# Patient Record
Sex: FEMALE | Race: WHITE | URBAN - METROPOLITAN AREA
[De-identification: names, ages, dates, MRNs, and addresses within clinical notes are randomized per-mention and may not be internally consistent; named-entity substitution may affect disease eponyms.]

---

## 2017-03-06 ENCOUNTER — OFFICE VISIT (OUTPATIENT)
Dept: BEHAVIORAL/MENTAL HEALTH CLINIC | Age: 32
End: 2017-03-06

## 2017-03-06 VITALS
SYSTOLIC BLOOD PRESSURE: 123 MMHG | DIASTOLIC BLOOD PRESSURE: 97 MMHG | HEIGHT: 63 IN | HEART RATE: 102 BPM | BODY MASS INDEX: 31.89 KG/M2 | WEIGHT: 180 LBS

## 2017-03-06 DIAGNOSIS — F41.9 ANXIETY: Chronic | ICD-10-CM

## 2017-03-06 DIAGNOSIS — F84.0 AUTISM SPECTRUM DISORDER: Chronic | ICD-10-CM

## 2017-03-06 DIAGNOSIS — F42.9 OCD (OBSESSIVE COMPULSIVE DISORDER): Primary | ICD-10-CM

## 2017-03-06 RX ORDER — ARIPIPRAZOLE 5 MG/1
2.5 TABLET ORAL
Qty: 15 TAB | Refills: 2 | Status: SHIPPED | OUTPATIENT
Start: 2017-03-06 | End: 2017-06-06 | Stop reason: SDUPTHER

## 2017-03-06 RX ORDER — FLUVOXAMINE MALEATE 100 MG/1
TABLET, COATED ORAL
Qty: 90 TAB | Refills: 2 | Status: SHIPPED | OUTPATIENT
Start: 2017-03-06 | End: 2017-06-06 | Stop reason: SDUPTHER

## 2017-03-06 RX ORDER — LAMOTRIGINE 200 MG/1
TABLET ORAL
Qty: 30 TAB | Refills: 2 | Status: SHIPPED | OUTPATIENT
Start: 2017-03-06 | End: 2017-06-06 | Stop reason: SDUPTHER

## 2017-03-06 NOTE — PROGRESS NOTES
Psychiatric Outpatient Progress Note    Account Number:  [id number]  Name: Apolinar Phillips    SUBJECTIVE:   CHIEF COMPLAINT:  Apolinar Phillips is a 28 y.o. female and was seen today for follow-up of psychiatric condition and psychotropic medication management. HPI:    Maude Chavez reports the following psychiatric symptoms: mood ok. 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 She continues to binge eat and mother is trying behavior modification to help with this. Pt and mom are exploring volunteering at food bank, [employer] or elder facility. No longer exercises. Patient denies SI/HI/SIB. Side Effects:  none      Fam/Soc Hx (from [place] with updates): The patient lives with her parents. She has talked about moving out before but does not have the life skills to be able to do so. 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 She is primarily supported by her parents. Her mother is her payee for her disability. She was raised by her parents. She has a younger brother who is 32. She is a high school graduate with honors and has no legal entanglements. No abuse history. Her hobbies include bible study, cruz-based groups and friends.      REVIEW OF SYSTEMS:  Psychiatric:  baseline  Appetite:  binge eating   Sleep: good       OBJECTIVE:                 Mental Status exam: WNL except for      Sensorium  oriented to time, place and person   Relations guarded    Eye Contact    poor   Appearance:  age appropriate and disheveled   Motor Behavior/Gait:  within normal limits   Speech:  normal pitch and normal volume   Thought Process: goal directed   Thought Content free of delusions and free of hallucinations   Suicidal ideations none   Homicidal ideations none   Mood:  euthymic   Affect:  euthymic   Memory recent  adequate   Memory remote:  adequate   Concentration:  adequate Abstraction:  concrete   Insight:  limited   Reliability fair   Judgment:  limited       MEDICAL DECISION MAKING  Data: pertinent labs, imaging, medical records and diagnostic tests reviewed and incorporated in diagnosis and treatment plan    Allergies   Allergen Reactions    Amoxil [Amoxicillin] Hives    Other Medication Rash     Benozyl Peroxide--Rash    Risperdal [Risperidone] Other (comments)     galactorrhea        Current Outpatient Prescriptions   Medication Sig Dispense Refill    lamoTRIgine (LAMICTAL) 200 mg tablet TAKE 1 TABLET BY MOUTH DAILY 30 Tab 2    fluvoxaMINE (LUVOX) 100 mg tablet TAKE THREE (3) TABLET(S) EVERY EVENING 90 Tab 2    ARIPiprazole (ABILIFY) 5 mg tablet Take 0.5 Tabs by mouth daily (after breakfast). 15 Tab 2    multivitamin (ONE A DAY) tablet Take 1 Tab by mouth daily.  clonazePAM (KLONOPIN) 0.5 mg tablet Take 1 Tab by mouth two (2) times daily as needed. Max Daily Amount: 1 mg. 30 Tab 0    norethindrone-ethinyl estradiol (NORINYL 1+35, 28,) 1-35 mg-mcg per tablet Take  by mouth daily. Visit Vitals    BP (!) 123/97    Pulse (!) 102    Ht 5' 3\" (1.6 m)    Wt 81.6 kg (180 lb)    BMI 31.89 kg/m2     Wt Readings from Last 3 Encounters:   03/06/17 81.6 kg (180 lb)   09/21/16 77.1 kg (170 lb)   08/12/16 75.1 kg (165 lb 8 oz)       Problems addressed today:    ICD-10-CM ICD-9-CM    1. OCD (obsessive compulsive disorder) F42.9 300.3 lamoTRIgine (LAMICTAL) 200 mg tablet      fluvoxaMINE (LUVOX) 100 mg tablet      ARIPiprazole (ABILIFY) 5 mg tablet   2. Anxiety F41.9 300.00 lamoTRIgine (LAMICTAL) 200 mg tablet      fluvoxaMINE (LUVOX) 100 mg tablet   3. Autism spectrum disorder F84.0 299.00 ARIPiprazole (ABILIFY) 5 mg tablet     1. Autism. 2. Anxiety NOS. 3. Obsessive-compulsive disorder. (Compulsive overeating). 4. Medical - obesity. 5. Psychosocial - environmental factors, developmental disorder needing continuous care and supervision.     Assessment:   Kiana Delaney Cornelius Reid  is a 28 y.o. female is responding to treatment. Symptoms are stable but is having some behavioral issues regarding binge eating. Mother is monitoring and addressing as necessary. Discussed healthy way of coping with stress and how to incorporate exercise into her schedule now that she is not working. Pt also trying to slow down some shopping activities as she no longer has an income. Asymptomatic HTN today. Patient denies SI/HI/SIB. No evidence of AH/VH or delusions. Risk Scoring- chronic illnesses and prescription drug management    Treatment Plan:  1. Medications:          Medication Changes/Adjustments:   Luvox 300 mg qhs  Lamictal 200 mg daily  Abilify 2.5 mg daily  Klonopin 0.5 mg bid -has 3 refills left 9/2016    Current Outpatient Prescriptions   Medication Sig Dispense Refill    lamoTRIgine (LAMICTAL) 200 mg tablet TAKE 1 TABLET BY MOUTH DAILY 30 Tab 2    fluvoxaMINE (LUVOX) 100 mg tablet TAKE THREE (3) TABLET(S) EVERY EVENING 90 Tab 2    ARIPiprazole (ABILIFY) 5 mg tablet Take 0.5 Tabs by mouth daily (after breakfast). 15 Tab 2    multivitamin (ONE A DAY) tablet Take 1 Tab by mouth daily.  clonazePAM (KLONOPIN) 0.5 mg tablet Take 1 Tab by mouth two (2) times daily as needed. Max Daily Amount: 1 mg. 30 Tab 0    norethindrone-ethinyl estradiol (NORINYL 1+35, 28,) 1-35 mg-mcg per tablet Take  by mouth daily. The following regarding medications was addressed:    (The risks and benefits of the proposed medication; the potential medication side effects ie    dry mouth, weight gain, GI upset, headache; patient given opportunity to ask questions)       2. Counseling and coordination of care including instructions for treatment, risks/benefits, risk factor reduction and patient/family education. She agrees with the plan. Patient instructed to call with any side effects, questions or issues.   20 minutes CBT on the following:   -treatment goals:  20 min exercise 4 x per week   -negative thoughts -write and release (in process)    3. Follow-up Disposition:  Return in about 3 months (around 6/6/2017). PSYCHOTHERAPY:  approx 20 minutes  Type:  Supportive/Cognitive Behavioral therapy provided  Focus:     Current problems -food choices, increasing physical activities, money management   Occupational issues   Interpersonal conflicts  Psychoeducation provided  Treatment plan reviewed with patient-including diagnosis and medications    Yaquelin Castillo is progressing.     Tyler Max NP  3/6/2017

## 2017-03-06 NOTE — MR AVS SNAPSHOT
Visit Information Date & Time Provider Department Dept. Phone Encounter #  
 3/6/2017  9:30 AM Tyler Curtisrand, [facility] [phone] [id number] Follow-up Instructions Return in about 3 months (around 6/6/2017). Upcoming Health Maintenance Date Due INFLUENZA AGE 9 TO ADULT 8/1/2016 PAP AKA CERVICAL CYTOLOGY 9/5/2017 DTaP/Tdap/Td series (7 - Td) 4/8/2018 Allergies as of 3/6/2017  Review Complete On: 3/6/2017 By: Tressa Delgado Severity Noted Reaction Type Reactions Amoxil [Amoxicillin]  01/12/2010    Hives Other Medication  01/12/2010    Rash Benozyl [employer] Risperdal [Risperidone]  03/19/2015    Other (comments)  
 galactorrhea Current Immunizations  Reviewed on 10/12/2011 Name Date DTAP Vaccine 3/21/1990, 8/12/1986, 1985, 1985, 1985 HIB Vaccine 2/27/1987 Hepatitis B Vaccine 8/20/2002, 8/31/2001, 4/16/2001 MMR Vaccine 5/10/1995, 5/13/1986 OPV 3/21/1990, 8/12/1986, 1985, 1985 TDAP Vaccine 4/8/2008 Not reviewed this visit You Were Diagnosed With   
  
 Codes Comments OCD (obsessive compulsive disorder)    -  Primary ICD-10-CM: F42.9 ICD-9-CM: 300.3 Anxiety     ICD-10-CM: F41.9 ICD-9-CM: 300.00 Autism spectrum disorder     ICD-10-CM: F84.0 ICD-9-CM: 299.00 Vitals BP Pulse Height(growth percentile) Weight(growth percentile) BMI OB Status (!) 123/97 (!) 102 5' 3\" (1.6 m) 180 lb (81.6 kg) 31.89 kg/m2 Having regular periods Smoking Status Never Smoker Vitals History BMI and BSA Data Body Mass Index Body Surface Area  
 31.89 kg/m 2 1.9 m 2 Preferred Pharmacy Pharmacy Name Phone [facility] [address], [address] [phone] Your Updated Medication List  
  
   
This list is accurate as of: 3/6/17  9:46 AM.  Always use your most recent med list.  
  
  
  
 ARIPiprazole 5 mg tablet Commonly known as:  ABILIFY Take 0.5 Tabs by mouth daily (after breakfast). clonazePAM 0.5 mg tablet Commonly known as:  Guy Pen Take 1 Tab by mouth two (2) times daily as needed. Max Daily Amount: 1 mg. fluvoxaMINE 100 mg tablet Commonly known as:  Raymangod Thiago TAKE THREE (3) TABLET(S) EVERY EVENING  
  
 lamoTRIgine 200 mg tablet Commonly known as: LaMICtal  
TAKE 1 TABLET BY MOUTH DAILY  
  
 multivitamin tablet Commonly known as:  ONE A DAY Take 1 Tab by mouth daily. NORINYL 1+35 (28) 1-35 mg-mcg Tab Generic drug:  norethindrone-ethinyl estradiol Take  by mouth daily. Prescriptions Sent to Pharmacy Refills  
 lamoTRIgine (LAMICTAL) 200 mg tablet 2 Sig: TAKE 1 TABLET BY MOUTH DAILY Class: Normal  
 Pharmacy: [facility] Ph #: [phone]  
 fluvoxaMINE (LUVOX) 100 mg tablet 2 Sig: TAKE THREE (3) TABLET(S) EVERY EVENING Class: Normal  
 Pharmacy: [facility] [address] Ph #: [phone] ARIPiprazole (ABILIFY) 5 mg tablet 2 Sig: Take 0.5 Tabs by mouth daily (after breakfast). Class: Normal  
 Pharmacy: [facility] [address] Ph #: [phone] Route: Oral  
  
Follow-up Instructions Return in about 3 months (around 6/6/2017). Introducing [facility] & HEALTH SERVICES! Srinivas Malik introduces [employer] patient portal. Now you can access parts of your medical record, email your doctor's office, and request medication refills online. 1. In your internet browser, go to [email or web address] 2. Click on the First Time User? Click Here link in the Sign In box. You will see the New Member Sign Up page. 3. Enter your [employer] Access Code exactly as it appears below. You will not need to use this code after youve completed the sign-up process.  If you do not sign up before the expiration date, you must request a new code. · [employer] Access Code: [email or web address] Expires: 6/4/2017  9:46 AM 
 
4. Enter the last four digits of your Social Security Number (xxxx) and Date of Birth (mm/dd/yyyy) as indicated and click Submit. You will be taken to the next sign-up page. 5. Create a [employer] ID. This will be your [employer] login ID and cannot be changed, so think of one that is secure and easy to remember. 6. Create a [employer] password. You can change your password at any time. 7. Enter your Password Reset Question and Answer. This can be used at a later time if you forget your password. 8. Enter your e-mail address. You will receive e-mail notification when new information is available in [address] 9. Click Sign Up. You can now view and download portions of your medical record. 10. Click the Download Summary menu link to download a portable copy of your medical information. If you have questions, please visit the Frequently Asked Questions section of the [employer] website. Remember, [employer] is NOT to be used for urgent needs. For medical emergencies, dial 911. Now available from your iPhone and Android! Please provide this summary of care documentation to your next provider. Your primary care clinician is listed as [phone] If you have any questions after today's visit, please call [phone].

## 2017-03-18 DIAGNOSIS — F42.9 OCD (OBSESSIVE COMPULSIVE DISORDER): ICD-10-CM

## 2017-03-18 DIAGNOSIS — F41.9 ANXIETY: Chronic | ICD-10-CM

## 2017-03-20 RX ORDER — LAMOTRIGINE 200 MG/1
TABLET ORAL
Qty: 30 TAB | Refills: 2 | OUTPATIENT
Start: 2017-03-20

## 2017-03-20 RX ORDER — FLUVOXAMINE MALEATE 100 MG/1
TABLET, COATED ORAL
Qty: 90 TAB | Refills: 2 | OUTPATIENT
Start: 2017-03-20

## 2017-03-24 DIAGNOSIS — F84.0 AUTISM SPECTRUM DISORDER: Chronic | ICD-10-CM

## 2017-03-24 DIAGNOSIS — F42.9 OCD (OBSESSIVE COMPULSIVE DISORDER): ICD-10-CM

## 2017-03-27 RX ORDER — ARIPIPRAZOLE 5 MG/1
TABLET ORAL
Qty: 15 TAB | Refills: 2 | OUTPATIENT
Start: 2017-03-27

## 2017-06-06 ENCOUNTER — OFFICE VISIT (OUTPATIENT)
Dept: BEHAVIORAL/MENTAL HEALTH CLINIC | Age: 32
End: 2017-06-06

## 2017-06-06 VITALS
BODY MASS INDEX: 31.54 KG/M2 | WEIGHT: 178 LBS | DIASTOLIC BLOOD PRESSURE: 88 MMHG | HEIGHT: 63 IN | SYSTOLIC BLOOD PRESSURE: 121 MMHG | HEART RATE: 81 BPM

## 2017-06-06 DIAGNOSIS — F42.8 OTHER OBSESSIVE-COMPULSIVE DISORDERS: ICD-10-CM

## 2017-06-06 DIAGNOSIS — F84.0 AUTISM SPECTRUM DISORDER: Chronic | ICD-10-CM

## 2017-06-06 DIAGNOSIS — F41.9 ANXIETY: Chronic | ICD-10-CM

## 2017-06-06 RX ORDER — ARIPIPRAZOLE 5 MG/1
2.5 TABLET ORAL
Qty: 15 TAB | Refills: 2 | Status: SHIPPED | OUTPATIENT
Start: 2017-06-06 | End: 2017-12-06 | Stop reason: ALTCHOICE

## 2017-06-06 RX ORDER — LAMOTRIGINE 200 MG/1
TABLET ORAL
Qty: 30 TAB | Refills: 2 | Status: SHIPPED | OUTPATIENT
Start: 2017-06-06 | End: 2017-09-06 | Stop reason: SDUPTHER

## 2017-06-06 RX ORDER — FLUVOXAMINE MALEATE 100 MG/1
TABLET, COATED ORAL
Qty: 90 TAB | Refills: 2 | Status: SHIPPED | OUTPATIENT
Start: 2017-06-06 | End: 2017-09-06 | Stop reason: SDUPTHER

## 2017-06-06 NOTE — PROGRESS NOTES
Psychiatric Outpatient Progress Note    Account Number:  [id number]  Name: Josiah Jhaveri    SUBJECTIVE:   CHIEF COMPLAINT:  Josiah Jhaveri is a 28 y.o. female and was seen today for follow-up of psychiatric condition and psychotropic medication management. HPI:    Jamia Griffin reports the following psychiatric symptoms: mood ok. 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 She continues to binge eat and mother is trying behavior modification to help with this. 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 No longer exercises. Patient denies SI/HI/SIB. Side Effects:  none      Fam/Soc Hx (from [place] with updates): The patient lives with her parents. She has talked about moving out before but does not have the life skills to be able to do so. 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 She is primarily supported by her parents. Her mother is her payee for her disability. She was raised by her parents. She has a younger brother who is 32. She is a high school graduate with honors and has no legal entanglements. No abuse history. Her hobbies include bible study, cruz-based groups and friends.      REVIEW OF SYSTEMS:  Psychiatric:  baseline  Appetite:  binge eating   Sleep: good       OBJECTIVE:                 Mental Status exam: WNL except for      Sensorium  oriented to time, place and person   Relations guarded    Eye Contact    poor   Appearance:  age appropriate and disheveled   Motor Behavior/Gait:  within normal limits   Speech:  normal pitch and normal volume   Thought Process: goal directed   Thought Content free of delusions and free of hallucinations   Suicidal ideations none   Homicidal ideations none   Mood:  euthymic   Affect:  euthymic   Memory recent  adequate   Memory remote:  adequate   Concentration:  adequate   Abstraction: concrete   Insight:  limited   Reliability fair   Judgment:  limited       MEDICAL DECISION MAKING  Data: pertinent labs, imaging, medical records and diagnostic tests reviewed and incorporated in diagnosis and treatment plan    Allergies   Allergen Reactions    Amoxil [Amoxicillin] Hives    Other Medication Rash     Benozyl Peroxide--Rash    Risperdal [Risperidone] Other (comments)     galactorrhea        Current Outpatient Prescriptions   Medication Sig Dispense Refill    lamoTRIgine (LAMICTAL) 200 mg tablet TAKE 1 TABLET BY MOUTH DAILY 30 Tab 2    fluvoxaMINE (LUVOX) 100 mg tablet TAKE THREE (3) TABLET(S) EVERY EVENING 90 Tab 2    ARIPiprazole (ABILIFY) 5 mg tablet Take 0.5 Tabs by mouth daily (after breakfast). 15 Tab 2    multivitamin (ONE A DAY) tablet Take 1 Tab by mouth daily.  clonazePAM (KLONOPIN) 0.5 mg tablet Take 1 Tab by mouth two (2) times daily as needed. Max Daily Amount: 1 mg. 30 Tab 0    norethindrone-ethinyl estradiol (NORINYL 1+35, 28,) 1-35 mg-mcg per tablet Take  by mouth daily. Visit Vitals    /88    Pulse 81    Ht 5' 3\" (1.6 m)    Wt 80.7 kg (178 lb)    BMI 31.53 kg/m2     Wt Readings from Last 3 Encounters:   06/06/17 80.7 kg (178 lb)   03/06/17 81.6 kg (180 lb)   09/21/16 77.1 kg (170 lb)       Problems addressed today:    ICD-10-CM ICD-9-CM    1. Other obsessive-compulsive disorders F42.8  lamoTRIgine (LAMICTAL) 200 mg tablet      fluvoxaMINE (LUVOX) 100 mg tablet      ARIPiprazole (ABILIFY) 5 mg tablet   2. Anxiety F41.9 300.00 lamoTRIgine (LAMICTAL) 200 mg tablet      fluvoxaMINE (LUVOX) 100 mg tablet   3. Autism spectrum disorder F84.0 299.00 ARIPiprazole (ABILIFY) 5 mg tablet     1. Autism. 2. Anxiety NOS. 3. Obsessive-compulsive disorder. (Compulsive overeating). 4. Medical - obesity. 5. Psychosocial - environmental factors, developmental disorder needing continuous care and supervision.     Assessment:   Tamir Gayle  is a 28 y.o. female is responding to treatment. Symptoms are stable but is having some behavioral issues regarding binge eating. Mother is monitoring and addressing as necessary. Discussed healthy way of coping with stress and how to incorporate exercise into her schedule now that she is not working. Pt also trying to slow down some shopping activities as she no longer has an income. Patient denies SI/HI/SIB. No evidence of AH/VH or delusions. Risk Scoring- chronic illnesses and prescription drug management    Treatment Plan:  1. Medications:          Medication Changes/Adjustments:   Luvox 300 mg qhs  Lamictal 200 mg daily  Abilify 2.5 mg daily  Klonopin 0.5 mg bid -has 3 refills left 9/2016    Current Outpatient Prescriptions   Medication Sig Dispense Refill    lamoTRIgine (LAMICTAL) 200 mg tablet TAKE 1 TABLET BY MOUTH DAILY 30 Tab 2    fluvoxaMINE (LUVOX) 100 mg tablet TAKE THREE (3) TABLET(S) EVERY EVENING 90 Tab 2    ARIPiprazole (ABILIFY) 5 mg tablet Take 0.5 Tabs by mouth daily (after breakfast). 15 Tab 2    multivitamin (ONE A DAY) tablet Take 1 Tab by mouth daily.  clonazePAM (KLONOPIN) 0.5 mg tablet Take 1 Tab by mouth two (2) times daily as needed. Max Daily Amount: 1 mg. 30 Tab 0    norethindrone-ethinyl estradiol (NORINYL 1+35, 28,) 1-35 mg-mcg per tablet Take  by mouth daily. The following regarding medications was addressed:    (The risks and benefits of the proposed medication; the potential medication side effects ie    dry mouth, weight gain, GI upset, headache; patient given opportunity to ask questions)       2. Counseling and coordination of care including instructions for treatment, risks/benefits, risk factor reduction and patient/family education. She agrees with the plan. Patient instructed to call with any side effects, questions or issues.   20 minutes CBT on the following:   -treatment goals:  20 min exercise 4 x per week   -negative thoughts -write and release (in process)    3. Follow-up Disposition:  Return in about 3 months (around 9/6/2017). PSYCHOTHERAPY:  approx 20 minutes  Type:  Supportive/Cognitive Behavioral therapy provided  Focus:     Current problems -food choices, increasing physical activities, money management   Occupational issues   Interpersonal conflicts  Psychoeducation provided  Treatment plan reviewed with patient-including diagnosis and medications    Jamia Griffin is progressing.     Rosendo Iverson NP  6/6/2017

## 2017-06-06 NOTE — MR AVS SNAPSHOT
Visit Information Date & Time Provider Department Dept. Phone Encounter #  
 6/6/2017  2:30 PM Autumn Dye, [address] [facility] [id number] Follow-up Instructions Return in about 3 months (around 9/6/2017). Upcoming Health Maintenance Date Due  
 PAP AKA CERVICAL CYTOLOGY 9/5/2017 INFLUENZA AGE 9 TO ADULT 8/1/2017 DTaP/Tdap/Td series (7 - Td) 4/8/2018 Allergies as of 6/6/2017  Review Complete On: 6/6/2017 By: Autumn Dye NP Severity Noted Reaction Type Reactions Amoxil [Amoxicillin]  01/12/2010    Hives Other Medication  01/12/2010    Rash Benozyl [employer] Risperdal [Risperidone]  03/19/2015    Other (comments)  
 galactorrhea Current Immunizations  Reviewed on 10/12/2011 Name Date DTAP Vaccine 3/21/1990, 8/12/1986, 1985, 1985, 1985 HIB Vaccine 2/27/1987 Hepatitis B Vaccine 8/20/2002, 8/31/2001, 4/16/2001 MMR Vaccine 5/10/1995, 5/13/1986 OPV 3/21/1990, 8/12/1986, 1985, 1985 TDAP Vaccine 4/8/2008 Not reviewed this visit You Were Diagnosed With   
  
 Codes Comments Other obsessive-compulsive disorders     ICD-10-CM: F42.8 Anxiety     ICD-10-CM: F41.9 ICD-9-CM: 300.00 Autism spectrum disorder     ICD-10-CM: F84.0 ICD-9-CM: 299.00 Vitals BP Pulse Height(growth percentile) Weight(growth percentile) BMI OB Status 121/88 81 5' 3\" (1.6 m) 178 lb (80.7 kg) 31.53 kg/m2 Having regular periods Smoking Status Never Smoker Vitals History BMI and BSA Data Body Mass Index Body Surface Area  
 31.53 kg/m 2 1.89 m 2 Preferred Pharmacy Pharmacy Name Phone [facility] [address], [address] [phone] Your Updated Medication List  
  
   
This list is accurate as of: 6/6/17  2:51 PM.  Always use your most recent med list.  
  
  
  
  
 ARIPiprazole 5 mg tablet Commonly known as:  ABILIFY Take 0.5 Tabs by mouth daily (after breakfast). clonazePAM 0.5 mg tablet Commonly known as:  Jonny Bur Take 1 Tab by mouth two (2) times daily as needed. Max Daily Amount: 1 mg. fluvoxaMINE 100 mg tablet Commonly known as:  Sha Nishi TAKE THREE (3) TABLET(S) EVERY EVENING  
  
 lamoTRIgine 200 mg tablet Commonly known as: LaMICtal  
TAKE 1 TABLET BY MOUTH DAILY  
  
 multivitamin tablet Commonly known as:  ONE A DAY Take 1 Tab by mouth daily. NORINYL 1/35 (28) 1-35 mg-mcg Tab Generic drug:  norethindrone-ethinyl estradiol Take  by mouth daily. Prescriptions Printed Refills  
 lamoTRIgine (LAMICTAL) 200 mg tablet 2 Sig: TAKE 1 TABLET BY MOUTH DAILY Class: Print  
 fluvoxaMINE (LUVOX) 100 mg tablet 2 Sig: TAKE THREE (3) TABLET(S) EVERY EVENING Class: Print ARIPiprazole (ABILIFY) 5 mg tablet 2 Sig: Take 0.5 Tabs by mouth daily (after breakfast). Class: Print Route: Oral  
  
Follow-up Instructions Return in about 3 months (around 9/6/2017). Introducing [facility] & HEALTH SERVICES! Malik Virk introduces [email or web address] patient portal. Now you can access parts of your medical record, email your doctor's office, and request medication refills online. 1. In your internet browser, go to [email or web address] 2. Click on the First Time User? Click Here link in the Sign In box. You will see the New Member Sign Up page. 3. Enter your [email or web address] Access Code exactly as it appears below. You will not need to use this code after youve completed the sign-up process. If you do not sign up before the expiration date, you must request a new code. · [email or web address] Access Code: [email or web address] Expires: 9/4/2017  2:49 PM 
 
4. Enter the last four digits of your Social Security Number (xxxx) and Date of Birth (mm/dd/yyyy) as indicated and click Submit.  You will be taken to the next sign-up page. 5. Create a [employer] - Smartphone RFID ID. This will be your [employer] Smart[employer] RF[employer] login ID and cannot be changed, so think of one that is secure and easy to remember. 6. Create a [employer] Smart[employer] RF[employer] password. You can change your password at any time. 7. Enter your Password Reset Question and Answer. This can be used at a later time if you forget your password. 8. Enter your e-mail address. You will receive e-mail notification when new information is available in [address] 9. Click Sign Up. You can now view and download portions of your medical record. 10. Click the Download Summary menu link to download a portable copy of your medical information. If you have questions, please visit the Frequently Asked Questions section of the [employer] website. Remember, [employer] is NOT to be used for urgent needs. For medical emergencies, dial 911. Now available from your iPhone and Android! Please provide this summary of care documentation to your next provider. Your primary care clinician is listed as [phone] If you have any questions after today's visit, please call [phone].

## 2017-08-28 DIAGNOSIS — F42.8 OTHER OBSESSIVE-COMPULSIVE DISORDERS: ICD-10-CM

## 2017-08-28 DIAGNOSIS — F84.0 AUTISM SPECTRUM DISORDER: Chronic | ICD-10-CM

## 2017-08-29 RX ORDER — ARIPIPRAZOLE 5 MG/1
TABLET ORAL
Qty: 15 TAB | Refills: 2 | OUTPATIENT
Start: 2017-08-29

## 2017-09-06 ENCOUNTER — OFFICE VISIT (OUTPATIENT)
Dept: BEHAVIORAL/MENTAL HEALTH CLINIC | Age: 32
End: 2017-09-06

## 2017-09-06 VITALS
RESPIRATION RATE: 20 BRPM | HEART RATE: 89 BPM | SYSTOLIC BLOOD PRESSURE: 117 MMHG | DIASTOLIC BLOOD PRESSURE: 82 MMHG | BODY MASS INDEX: 31.43 KG/M2 | TEMPERATURE: 98.4 F | HEIGHT: 63 IN | WEIGHT: 177.4 LBS

## 2017-09-06 DIAGNOSIS — F42.8 OTHER OBSESSIVE-COMPULSIVE DISORDERS: ICD-10-CM

## 2017-09-06 DIAGNOSIS — F41.9 ANXIETY: Primary | Chronic | ICD-10-CM

## 2017-09-06 DIAGNOSIS — F84.0 AUTISM SPECTRUM DISORDER: Chronic | ICD-10-CM

## 2017-09-06 RX ORDER — LAMOTRIGINE 200 MG/1
TABLET ORAL
Qty: 30 TAB | Refills: 2 | Status: SHIPPED | OUTPATIENT
Start: 2017-09-06 | End: 2017-12-06 | Stop reason: ALTCHOICE

## 2017-09-06 RX ORDER — FLUVOXAMINE MALEATE 100 MG/1
TABLET, COATED ORAL
Qty: 90 TAB | Refills: 2 | Status: SHIPPED | OUTPATIENT
Start: 2017-09-06 | End: 2017-12-06 | Stop reason: ALTCHOICE

## 2017-09-06 NOTE — PROGRESS NOTES
Chief Complaint   Patient presents with    Follow-up     Autism spectrum disorder      1. Have you been to the ER, urgent care clinic since your last visit? Hospitalized since your last visit? No     2. Have you seen or consulted any other health care providers outside of the [address] [place] [address] since your last visit? Include any pap smears or colon screening.  No

## 2017-09-06 NOTE — PROGRESS NOTES
Psychiatric Outpatient Progress Note    Account Number:  [id number]  Name: Ester Ivory    SUBJECTIVE:   CHIEF COMPLAINT:  Ester Ivory is a 28 y.o. female and was seen today for follow-up of psychiatric condition and psychotropic medication management. HPI:    Jovanni Whalen reports the following psychiatric symptoms: mood ok. More focused on recycling. She is not working. Mom is concerned about Abilify cost and would like a trial off medication. She continues to binge eat and mother is trying behavior modification to help with this. 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 No longer exercises. Patient denies SI/HI/SIB. Side Effects:  none      Fam/Soc Hx (from [place] with updates): The patient lives with her parents. She has talked about moving out before but does not have the life skills to be able to do so. 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 She is primarily supported by her parents. Her mother is her payee for her disability. She was raised by her parents. She has a younger brother who is 32. She is a high school graduate with honors and has no legal entanglements. No abuse history. Her hobbies include bible study, cruz-based groups and friends.      REVIEW OF SYSTEMS:  Psychiatric:  baseline  Appetite:  binge eating   Sleep: good       OBJECTIVE:                 Mental Status exam: WNL except for      Sensorium  oriented to time, place and person   Relations guarded    Eye Contact    poor   Appearance:  age appropriate and disheveled   Motor Behavior/Gait:  within normal limits   Speech:  normal pitch and normal volume   Thought Process: goal directed   Thought Content free of delusions and free of hallucinations   Suicidal ideations none   Homicidal ideations none   Mood:  euthymic   Affect:  euthymic   Memory recent adequate   Memory remote:  adequate   Concentration:  adequate   Abstraction:  concrete   Insight:  limited   Reliability fair   Judgment:  limited       MEDICAL DECISION MAKING  Data: pertinent labs, imaging, medical records and diagnostic tests reviewed and incorporated in diagnosis and treatment plan    Allergies   Allergen Reactions    Amoxil [Amoxicillin] Hives    Other Medication Rash     Benozyl Peroxide--Rash    Risperdal [Risperidone] Other (comments)     galactorrhea        Current Outpatient Prescriptions   Medication Sig Dispense Refill    lamoTRIgine (LAMICTAL) 200 mg tablet TAKE 1 TABLET BY MOUTH DAILY 30 Tab 2    fluvoxaMINE (LUVOX) 100 mg tablet TAKE THREE (3) TABLET(S) EVERY EVENING 90 Tab 2    ARIPiprazole (ABILIFY) 5 mg tablet Take 0.5 Tabs by mouth daily (after breakfast). 15 Tab 2    multivitamin (ONE A DAY) tablet Take 1 Tab by mouth daily.  clonazePAM (KLONOPIN) 0.5 mg tablet Take 1 Tab by mouth two (2) times daily as needed. Max Daily Amount: 1 mg. 30 Tab 0    norethindrone-ethinyl estradiol (NORINYL 1+35, 28,) 1-35 mg-mcg per tablet Take  by mouth daily. Visit Vitals    /82    Pulse 89    Temp 98.4 °F (36.9 °C) (Oral)    Resp 20    Ht 5' 3\" (1.6 m)    Wt 80.5 kg (177 lb 6.4 oz)    LMP 08/21/2017    BMI 31.42 kg/m2     Wt Readings from Last 3 Encounters:   09/06/17 80.5 kg (177 lb 6.4 oz)   06/06/17 80.7 kg (178 lb)   03/06/17 81.6 kg (180 lb)       Problems addressed today:    ICD-10-CM ICD-9-CM    1. Anxiety F41.9 300.00 lamoTRIgine (LAMICTAL) 200 mg tablet      fluvoxaMINE (LUVOX) 100 mg tablet   2. Other obsessive-compulsive disorders F42.8  lamoTRIgine (LAMICTAL) 200 mg tablet      fluvoxaMINE (LUVOX) 100 mg tablet   3. Autism spectrum disorder F84.0 299.00      1. Autism. 2. Anxiety NOS. 3. Obsessive-compulsive disorder. (Compulsive overeating). 4. Medical - obesity.   5. Psychosocial - environmental factors, developmental disorder needing continuous care and supervision. Assessment:   Ester Ivory  is a 28 y.o. female is responding to treatment. Symptoms are stable but is having some behavioral issues regarding binge eating. Mother is monitoring and addressing as necessary as she does not want her daughter on another medication to help this. .  Discussed healthy way of coping with stress and how to incorporate exercise into her schedule now that she is not working. Pt also trying to slow down some shopping activities as she no longer has an income. Patient denies SI/HI/SIB. No evidence of AH/VH or delusions. Risk Scoring- chronic illnesses and prescription drug management    Treatment Plan:  1. Medications:          Medication Changes/Adjustments:   Luvox 300 mg qhs  Lamictal 200 mg daily  Abilify 2.5 mg -hold for now  Klonopin 0.5 mg bid -has 3 refills left 9/2016  Explore [employer] for employment    Current Outpatient Prescriptions   Medication Sig Dispense Refill    lamoTRIgine (LAMICTAL) 200 mg tablet TAKE 1 TABLET BY MOUTH DAILY 30 Tab 2    fluvoxaMINE (LUVOX) 100 mg tablet TAKE THREE (3) TABLET(S) EVERY EVENING 90 Tab 2    ARIPiprazole (ABILIFY) 5 mg tablet Take 0.5 Tabs by mouth daily (after breakfast). 15 Tab 2    multivitamin (ONE A DAY) tablet Take 1 Tab by mouth daily.  clonazePAM (KLONOPIN) 0.5 mg tablet Take 1 Tab by mouth two (2) times daily as needed. Max Daily Amount: 1 mg. 30 Tab 0    norethindrone-ethinyl estradiol (NORINYL 1+35, 28,) 1-35 mg-mcg per tablet Take  by mouth daily. The following regarding medications was addressed:    (The risks and benefits of the proposed medication; the potential medication side effects ie    dry mouth, weight gain, GI upset, headache; patient given opportunity to ask questions)       2. Counseling and coordination of care including instructions for treatment, risks/benefits, risk factor reduction and patient/family education. She agrees with the plan.  Patient instructed to call with any side effects, questions or issues. 20 minutes CBT on the following:   -treatment goals:  20 min exercise 4 x per week   -negative thoughts -write and release (in process)    3. Follow-up Disposition:  Return in about 3 months (around 12/6/2017). PSYCHOTHERAPY:  approx 20 minutes  Type:  Supportive/Cognitive Behavioral therapy provided  Focus:     Current problems -food choices, increasing physical activities, money management   Occupational issues   Interpersonal conflicts  Psychoeducation provided  Treatment plan reviewed with patient-including diagnosis and medications    [facility] is progressing.     Acosta Marroquin NP  9/11/2017

## 2017-09-06 NOTE — MR AVS SNAPSHOT
Visit Information Date & Time Provider Department Dept. Phone Encounter #  
 9/6/2017  2:30 PM [facility] [phone] [id number] Upcoming Health Maintenance Date Due INFLUENZA AGE 9 TO ADULT 8/1/2017 PAP AKA CERVICAL CYTOLOGY 9/5/2017 DTaP/Tdap/Td series (8 - Td) 6/9/2027 Allergies as of 9/6/2017  Review Complete On: 9/6/2017 By: Danial Oliver LPN Severity Noted Reaction Type Reactions Amoxil [Amoxicillin]  01/12/2010    Hives Other Medication  01/12/2010    Rash Benozyl [employer] Risperdal [Risperidone]  03/19/2015    Other (comments)  
 galactorrhea Current Immunizations  Reviewed on 6/16/2017 Name Date DTAP Vaccine 3/21/1990, 8/12/1986, 1985, 1985, 1985 HIB Vaccine 2/27/1987 Hepatitis B Vaccine 8/20/2002, 8/31/2001, 4/16/2001 MMR Vaccine 5/10/1995, 5/13/1986 OPV 3/21/1990, 8/12/1986, 1985, 1985 TDAP Vaccine 4/8/2008 Tdap 6/9/2017 Not reviewed this visit You Were Diagnosed With   
  
 Codes Comments Anxiety    -  Primary ICD-10-CM: F41.9 ICD-9-CM: 300.00 Other obsessive-compulsive disorders     ICD-10-CM: F42.8 Autism spectrum disorder     ICD-10-CM: F84.0 ICD-9-CM: 299.00 Vitals BP Pulse Temp Resp Height(growth percentile) Weight(growth percentile) 117/82 89 98.4 °F (36.9 °C) (Oral) 20 5' 3\" (1.6 m) 177 lb 6.4 oz (80.5 kg) LMP BMI OB Status Smoking Status 08/21/2017 31.42 kg/m2 Having regular periods Never Smoker BMI and BSA Data Body Mass Index Body Surface Area  
 31.42 kg/m 2 1.89 m 2 Preferred Pharmacy Pharmacy Name Phone [id number], [address] [place] [address] [phone] Your Updated Medication List  
  
   
This list is accurate as of: 9/6/17  3:13 PM.  Always use your most recent med list.  
  
  
  
  
 ARIPiprazole 5 mg tablet Commonly known as:  ABILIFY Take 0.5 Tabs by mouth daily (after breakfast). clonazePAM 0.5 mg tablet Commonly known as:  Shireen Alex Take 1 Tab by mouth two (2) times daily as needed. Max Daily Amount: 1 mg. fluvoxaMINE 100 mg tablet Commonly known as:  Garrel Claw TAKE THREE (3) TABLET(S) EVERY EVENING  
  
 lamoTRIgine 200 mg tablet Commonly known as: LaMICtal  
TAKE 1 TABLET BY MOUTH DAILY  
  
 multivitamin tablet Commonly known as:  ONE A DAY Take 1 Tab by mouth daily. NORINYL 1/35 (28) 1-35 mg-mcg Tab Generic drug:  norethindrone-ethinyl estradiol Take  by mouth daily. Prescriptions Sent to Pharmacy Refills  
 lamoTRIgine (LAMICTAL) 200 mg tablet 2 Sig: TAKE 1 TABLET BY MOUTH DAILY Class: Normal  
 Pharmacy: [id number], [address] [place] [address] Ph #: [phone]  
 fluvoxaMINE (LUVOX) 100 mg tablet 2 Sig: TAKE THREE (3) TABLET(S) EVERY EVENING Class: Normal  
 Pharmacy: [address] [place] [address] , [address] [place] [address] Ph #: [phone] Introducing [facility] & HEALTH SERVICES! Elisa Carson introduces [employer] patient portal. Now you can access parts of your medical record, email your doctor's office, and request medication refills online. 1. In your internet browser, go to [email or web address]://[email or web address]. [employer] 2. Click on the First Time User? Click Here link in the Sign In box. You will see the New Member Sign Up page. 3. Enter your [employer] Access Code exactly as it appears below. You will not need to use this code after youve completed the sign-up process. If you do not sign up before the expiration date, you must request a new code. · [employer] Access Code: [email or web address] Expires: 12/5/2017  3:13 PM 
 
4. Enter the last four digits of your Social Security Number (xxxx) and Date of Birth (mm/dd/yyyy) as indicated and click Submit.  You will be taken to the next sign-up page. 5. Create a [employer] ID. This will be your [employer] login ID and cannot be changed, so think of one that is secure and easy to remember. 6. Create a [employer] password. You can change your password at any time. 7. Enter your Password Reset Question and Answer. This can be used at a later time if you forget your password. 8. Enter your e-mail address. You will receive e-mail notification when new information is available in [address]. 9. Click Sign Up. You can now view and download portions of your medical record. 10. Click the Download Summary menu link to download a portable copy of your medical information. If you have questions, please visit the Frequently Asked Questions section of the [employer] website. Remember, [employer] is NOT to be used for urgent needs. For medical emergencies, dial 911. Now available from your iPhone and Android! Please provide this summary of care documentation to your next provider. Your primary care clinician is listed as [id number] If you have any questions after today's visit, please call [phone].

## 2017-12-06 ENCOUNTER — OFFICE VISIT (OUTPATIENT)
Dept: BEHAVIORAL/MENTAL HEALTH CLINIC | Age: 32
End: 2017-12-06

## 2017-12-06 VITALS
WEIGHT: 167 LBS | BODY MASS INDEX: 29.59 KG/M2 | HEART RATE: 92 BPM | DIASTOLIC BLOOD PRESSURE: 88 MMHG | HEIGHT: 63 IN | SYSTOLIC BLOOD PRESSURE: 121 MMHG

## 2017-12-06 DIAGNOSIS — F84.0 AUTISM SPECTRUM DISORDER: Chronic | ICD-10-CM

## 2017-12-06 DIAGNOSIS — F41.9 ANXIETY: Primary | Chronic | ICD-10-CM

## 2017-12-06 DIAGNOSIS — F42.8 OTHER OBSESSIVE-COMPULSIVE DISORDERS: ICD-10-CM

## 2017-12-06 RX ORDER — LAMOTRIGINE 200 MG/1
TABLET ORAL
Refills: 2 | COMMUNITY
Start: 2017-11-14 | End: 2018-02-05 | Stop reason: SDUPTHER

## 2017-12-06 RX ORDER — CLONAZEPAM 0.5 MG/1
TABLET ORAL
COMMUNITY
End: 2018-11-14 | Stop reason: SDUPTHER

## 2017-12-06 RX ORDER — ARIPIPRAZOLE 5 MG/1
TABLET ORAL
Refills: 2 | COMMUNITY
Start: 2017-09-02 | End: 2018-02-05

## 2017-12-06 RX ORDER — FLUVOXAMINE MALEATE 100 MG/1
TABLET, COATED ORAL
Refills: 2 | COMMUNITY
Start: 2017-11-19 | End: 2018-02-05 | Stop reason: SDUPTHER

## 2017-12-06 NOTE — PROGRESS NOTES
Psychiatric Outpatient Progress Note    Account Number:  [id number]  Name: Marisol Lazo    SUBJECTIVE:   CHIEF COMPLAINT:  Marisol Lazo is a 28 y.o. female and was seen today for follow-up of psychiatric condition and psychotropic medication management. HPI:    Ella Moore reports the following psychiatric symptoms:  Mood is good. She is feeling proud of herself for limiting snacking and food hoarding. She is not working and has established contact with Kwasi Nunes. Patient denies SI/HI/SIB. Side Effects:  none      Fam/Soc Hx (from [place] with updates): The patient lives with her parents. She has talked about moving out before but does not have the life skills to be able to do so. She was employed at [employer] at [employer] until they closed. She is primarily supported by her parents. Her mother is her payee for her disability. She has a younger brother who is 32. She is a high school graduate with honors and has no legal entanglements. No abuse history. Her hobbies include bible study, cruz-based groups and friends.      REVIEW OF SYSTEMS:  Psychiatric:  baseline  Appetite:  binge eating   Sleep: good       OBJECTIVE:                 Mental Status exam: WNL except for      Sensorium  oriented to time, place and person   Relations guarded    Eye Contact    poor   Appearance:  age appropriate and disheveled   Motor Behavior/Gait:  within normal limits   Speech:  normal pitch and normal volume   Thought Process: goal directed   Thought Content free of delusions and free of hallucinations   Suicidal ideations none   Homicidal ideations none   Mood:  euthymic   Affect:  euthymic   Memory recent  adequate   Memory remote:  adequate   Concentration:  adequate   Abstraction:  concrete   Insight:  limited   Reliability fair   Judgment:  limited       MEDICAL DECISION MAKING  Data: pertinent labs, imaging, medical records and diagnostic tests reviewed and incorporated in diagnosis and treatment plan    Allergies   Allergen Reactions    Amoxil [Amoxicillin] Hives    Other Medication Rash     Benozyl Peroxide--Rash    Risperdal [Risperidone] Other (comments)     galactorrhea        Current Outpatient Prescriptions   Medication Sig Dispense Refill    ARIPiprazole (ABILIFY) 5 mg tablet TAKE ONE-HALF (1/2) TABLET BY MOUTH DAILY (AFTER BREAKFAST)  2    fluvoxaMINE (LUVOX) 100 mg tablet TAKE THREE (3) TABLET(S) EVERY EVENING  2    lamoTRIgine (LAMICTAL) 200 mg tablet TAKE 1 TABLET BY MOUTH DAILY  2    clonazePAM (KLONOPIN) 0.5 mg tablet Take  by mouth two (2) times daily as needed.  multivitamin (ONE A DAY) tablet Take 1 Tab by mouth daily.  norethindrone-ethinyl estradiol (NORINYL 1+35, 28,) 1-35 mg-mcg per tablet Take  by mouth daily. Visit Vitals    /88    Pulse 92    Ht 5' 3\" (1.6 m)    Wt 75.8 kg (167 lb)    LMP 12/06/2017    BMI 29.58 kg/m2     Wt Readings from Last 3 Encounters:   12/06/17 75.8 kg (167 lb)   09/06/17 80.5 kg (177 lb 6.4 oz)   06/06/17 80.7 kg (178 lb)       Problems addressed today:    ICD-10-CM ICD-9-CM    1. Anxiety F41.9 300.00    2. Autism spectrum disorder F84.0 299.00    3. Other obsessive-compulsive disorders F42.8 300.3      1. Autism. 2. Anxiety NOS. 3. Obsessive-compulsive disorder. (Compulsive overeating). 4. Medical - obesity. 5. Psychosocial - environmental factors, developmental disorder needing continuous care and supervision. Assessment:   Aniceto Matias  is a 28 y.o. female is responding to treatment. Symptoms are stable but is having some behavioral issues regarding binge eating which is slowly improving. No depression or anxiety. Pt remains fascinated about the Beatles (music group). Patient denies SI/HI/SIB. No evidence of AH/VH or delusions.     Risk Scoring- chronic illnesses and prescription drug management    Treatment Plan:    -Explore [employer] for employment  -Pt and mother are aware provider is leaving practice and they wish to stay with another provider in [facility]    1. Medications:          Medication Changes/Adjustments:           Current Outpatient Prescriptions   Medication Sig Dispense Refill    ARIPiprazole (ABILIFY) 5 mg tablet TAKE ONE-HALF (1/2) TABLET BY MOUTH DAILY (AFTER BREAKFAST)  2    fluvoxaMINE (LUVOX) 100 mg tablet TAKE THREE (3) TABLET(S) EVERY EVENING  2    lamoTRIgine (LAMICTAL) 200 mg tablet TAKE 1 TABLET BY MOUTH DAILY  2    clonazePAM (KLONOPIN) 0.5 mg tablet Take  by mouth two (2) times daily as needed.  multivitamin (ONE A DAY) tablet Take 1 Tab by mouth daily.  norethindrone-ethinyl estradiol (NORINYL 1+35, 28,) 1-35 mg-mcg per tablet Take  by mouth daily. The following regarding medications was addressed:    (The risks and benefits of the proposed medication; the potential medication side effects ie    dry mouth, weight gain, GI upset, headache; patient given opportunity to ask questions)       2. Counseling and coordination of care including instructions for treatment, risks/benefits, risk factor reduction and patient/family education. She agrees with the plan. Patient instructed to call with any side effects, questions or issues.     -treatment goals:  20 min exercise 4 x per week       3. Follow-up Disposition:  Return in about 3 months (around 3/6/2018). PSYCHOTHERAPY:  approx 10 minutes  Type:  Supportive/Cognitive Behavioral therapy provided  Focus:     Current problems -food choices, increasing physical activities, money management   Occupational issues   Interpersonal conflicts  Psychoeducation provided  Treatment plan reviewed with patient-including diagnosis and medications    [facility] is progressing.     Osorio Sepulveda NP  12/7/2017

## 2017-12-06 NOTE — MR AVS SNAPSHOT
Visit Information Date & Time Provider Department Dept. Phone Encounter #  
 12/6/2017  2:30 PM Sheron Watters, [address] [facility] [phone] [id number] Follow-up Instructions Return in about 3 months (around 3/6/2018). Upcoming Health Maintenance Date Due Influenza Age 5 to Adult 8/1/2017 PAP AKA CERVICAL CYTOLOGY 9/5/2017 DTaP/Tdap/Td series (8 - Td) 6/9/2027 Allergies as of 12/6/2017  Review Complete On: 12/6/2017 By: Sheron Watters NP Severity Noted Reaction Type Reactions Amoxil [Amoxicillin]  01/12/2010    Hives Other Medication  01/12/2010    Rash Benozyl [employer] Risperdal [Risperidone]  03/19/2015    Other (comments)  
 galactorrhea Current Immunizations  Reviewed on 6/16/2017 Name Date DTAP Vaccine 3/21/1990, 8/12/1986, 1985, 1985, 1985 HIB Vaccine 2/27/1987 Hepatitis B Vaccine 8/20/2002, 8/31/2001, 4/16/2001 MMR Vaccine 5/10/1995, 5/13/1986 OPV 3/21/1990, 8/12/1986, 1985, 1985 TDAP Vaccine 4/8/2008 Tdap 6/9/2017 Not reviewed this visit You Were Diagnosed With   
  
 Codes Comments Anxiety    -  Primary ICD-10-CM: F41.9 ICD-9-CM: 300.00 Autism spectrum disorder     ICD-10-CM: F84.0 ICD-9-CM: 299.00 Other obsessive-compulsive disorders     ICD-10-CM: F42.8 ICD-9-CM: 300.3 Vitals BP Pulse Height(growth percentile) Weight(growth percentile) LMP BMI  
 121/88 92 5' 3\" (1.6 m) 167 lb (75.8 kg) 12/06/2017 29.58 kg/m2 OB Status Smoking Status Having regular periods Never Smoker Vitals History BMI and BSA Data Body Mass Index Body Surface Area  
 29.58 kg/m 2 1.84 m 2 Preferred Pharmacy [facility] Name Phone [id number] [address] [place] [address] [phone] Your Updated Medication List  
  
   
 This list is accurate as of: 12/6/17  2:52 PM.  Always use your most recent med list.  
  
  
  
  
 ARIPiprazole 5 mg tablet Commonly known as:  ABILIFY TAKE ONE-HALF (1/2) TABLET BY MOUTH DAILY (AFTER BREAKFAST) fluvoxaMINE 100 mg tablet Commonly known as:  Keanu Sours TAKE THREE (3) TABLET(S) EVERY EVENING KlonoPIN 0.5 mg tablet Generic drug:  clonazePAM  
Take  by mouth two (2) times daily as needed. lamoTRIgine 200 mg tablet Commonly known as: LaMICtal  
TAKE 1 TABLET BY MOUTH DAILY  
  
 multivitamin tablet Commonly known as:  ONE A DAY Take 1 Tab by mouth daily. NORINYL 1/35 (28) 1-35 mg-mcg Tab Generic drug:  norethindrone-ethinyl estradiol Take  by mouth daily. Follow-up Instructions Return in about 3 months (around 3/6/2018). Introducing [facility]! Reed Mast introduces [employer] patient portal. Now you can access parts of your medical record, email your doctor's office, and request medication refills online. 1. In your internet browser, go to [email or web address]. [email or web address] 2. Click on the First Time User? Click Here link in the Sign In box. You will see the New Member Sign Up page. 3. Enter your [employer] Access Code exactly as it appears below. You will not need to use this code after youve completed the sign-up process. If you do not sign up before the expiration date, you must request a new code. · [employer] Access Code: [email or web address] Expires: 3/6/2018  2:52 PM 
 
4. Enter the last four digits of your Social Security Number (xxxx) and Date of Birth (mm/dd/yyyy) as indicated and click Submit. You will be taken to the next sign-up page. 5. Create a [email or web address] ID. This will be your [employer] login ID and cannot be changed, so think of one that is secure and easy to remember. 6. Create a [employer] password. You can change your password at any time. 7. Enter your Password Reset Question and Answer.  This can be used at a later time if you forget your password. 8. Enter your e-mail address. You will receive e-mail notification when new information is available in [address] 9. Click Sign Up. You can now view and download portions of your medical record. 10. Click the Download Summary menu link to download a portable copy of your medical information. If you have questions, please visit the Frequently Asked Questions section of the [employer] website. Remember, [employer] is NOT to be used for urgent needs. For medical emergencies, dial 911. Now available from your iPhone and Android! Please provide this summary of care documentation to your next provider. Your primary care clinician is listed as [id number] If you have any questions after today's visit, please call [phone].

## 2017-12-15 DIAGNOSIS — F41.9 ANXIETY: Chronic | ICD-10-CM

## 2017-12-15 DIAGNOSIS — F42.8 OTHER OBSESSIVE-COMPULSIVE DISORDERS: ICD-10-CM

## 2017-12-18 RX ORDER — LAMOTRIGINE 200 MG/1
TABLET ORAL
Qty: 30 TAB | Refills: 2 | Status: SHIPPED | OUTPATIENT
Start: 2017-12-18 | End: 2018-03-14 | Stop reason: SDUPTHER

## 2017-12-19 DIAGNOSIS — F41.9 ANXIETY: Chronic | ICD-10-CM

## 2017-12-19 DIAGNOSIS — F42.8 OTHER OBSESSIVE-COMPULSIVE DISORDERS: ICD-10-CM

## 2017-12-20 RX ORDER — FLUVOXAMINE MALEATE 100 MG/1
TABLET, COATED ORAL
Qty: 90 TAB | Refills: 2 | Status: SHIPPED | OUTPATIENT
Start: 2017-12-20 | End: 2018-03-18 | Stop reason: SDUPTHER

## 2018-02-05 ENCOUNTER — OFFICE VISIT (OUTPATIENT)
Dept: BEHAVIORAL/MENTAL HEALTH CLINIC | Age: 33
End: 2018-02-05

## 2018-02-05 VITALS
DIASTOLIC BLOOD PRESSURE: 89 MMHG | HEART RATE: 91 BPM | WEIGHT: 161 LBS | HEIGHT: 63 IN | BODY MASS INDEX: 28.53 KG/M2 | SYSTOLIC BLOOD PRESSURE: 122 MMHG

## 2018-02-05 DIAGNOSIS — F42.8 OTHER OBSESSIVE-COMPULSIVE DISORDERS: ICD-10-CM

## 2018-02-05 DIAGNOSIS — F84.0 AUTISM SPECTRUM DISORDER: Primary | ICD-10-CM

## 2018-02-05 DIAGNOSIS — F41.9 ANXIETY: Chronic | ICD-10-CM

## 2018-02-05 DIAGNOSIS — E78.2 MIXED HYPERLIPIDEMIA: ICD-10-CM

## 2018-02-05 NOTE — PROGRESS NOTES
CHIEF COMPLAINT:  Tamera Hammer is a 28 y.o. female and was seen today to establish psychiatric care. \"Establish care after provider left practice\". HPI:      Ramsey Shirley reports the following psychiatric symptoms:  agitation and anxiety. The symptoms have been present for years (lifelong) and are of mild severity. The symptoms occur intermittently. Past symptoms include; biting herself when anxious, depression, constant anxiety, emotional outbursts, difficulty with sleeping, compulsive overeating, and violent behaviors. Associated current intermittent symptoms include; worrying, tension, easily bored, rumination (currenlty on how birth control can cause breast CA), sound sensitivity (vaccuming and ), tolerable to pain, anticipation anxiety, difficulty with change,  racing thoughts, irritability, and fatigue. Medications have made symptoms better. Caffeine has made symptoms worse. Pt is here today to discuss establishing care with a new provider. Pts score on the PHQ scale was a 4. This indicates minimal depression. Pt scored 11 on the HAM-A, which reflects mild anxiety. Pt screened negative on the MDQ. PAST HISTORY:  Psychiatric:  Past Psychiatric Hospitalization:  1x in December 2004 at [facility] for behavioral disturbances. Past Outpatient Providers:  Dr. Joana Hinton, NP, and Tena Martinez NP   Past Psychiatric Medications: Ritalin (made symptoms worse), Risperdal (increased prolactin), 5mg Abilify (stopped due to insurance coverage- stopped Aug 2017).  Current medications include; 100mg Luvox, 200mg Lamictal, 0.5mg Klonopin BID as needed     Medical:  Active Ambulatory Problems     Diagnosis Date Noted    Overweight (BMI 25.0-29.9) 10/06/2014    Autism spectrum disorder 03/16/2015    OCD (obsessive compulsive disorder) 03/19/2015    Anxiety 03/19/2015    History of recurrent UTIs 08/12/2016     Resolved Ambulatory Problems     Diagnosis Date Noted    No Resolved Ambulatory Problems     Past Medical History:   Diagnosis Date    Advanced care planning/counseling discussion     Allergic rhinitis     Anxiety and depression     Autism     Galactorrhea 12/2009    Headache(784.0)     History of chicken pox     OCD (obsessive compulsive disorder)     Strabismus     Superficial laceration 06/09/2017     Substance Use:   Illicit: Denies   Caffeine: Denies since 2008  Nicotine: Denies   ETOH: Denies     Social History     Social History    Marital status:      Spouse name: N/A    Number of children: N/A    Years of education: N/A     Social History Main Topics    Smoking status: Never Smoker    Smokeless tobacco: Never Used    Alcohol use No    Drug use: No    Sexual activity: Yes     Birth control/ protection: Pill     Other Topics Concern    None     Social History Narrative     Social:  Marital Status: Single. Not interested in any kind of intimate relationships. Children: Denies   Educational Level:  Graduated HS with Honors. Classmates were not always nice. IEP during school. Work History: Was employed at [employer] until they closed recently. Looking into job opportunities. Has reached out to [employer] for opportunities, is taking their job readiness class currently. Pt is on [employer]. Volunteering at [employer]. Legal History: Denies   Pertinent Childhood History: Raised by her mother and father. 1 younger brother. Denies childhood abuse/trauma. April 1989 was dx with autism. Current: Lives with her parents. Hobbies: cruz-based activities, singing, reading comics/magazines and music (beatles). Family:  Family history of mental or substance use history reported. Family history of medical problems reviewed. Maternal Grandmother:substance use ds (alcohol)  Maternal Grandfather: substance use ds (alcohol)  Mother: ADD/depression  Brother: ADD, EDS  Paternal Uncle: Autism features? Paternal Grandmother: OCD features?      MEDICATIONS:  Current Outpatient Prescriptions   Medication Sig Dispense Refill    fluvoxaMINE (LUVOX) 100 mg tablet TAKE THREE (3) TABLET(S) EVERY EVENING 90 Tab 2    lamoTRIgine (LAMICTAL) 200 mg tablet TAKE 1 TABLET BY MOUTH DAILY 30 Tab 2    clonazePAM (KLONOPIN) 0.5 mg tablet Take  by mouth two (2) times daily as needed.  multivitamin (ONE A DAY) tablet Take 1 Tab by mouth daily.  norethindrone-ethinyl estradiol (NORINYL 1+35, 28,) 1-35 mg-mcg per tablet Take  by mouth daily. ALLERGIES:  Allergies   Allergen Reactions    Amoxil [Amoxicillin] Hives    Other Medication Rash     Benozyl Peroxide--Rash    Risperdal [Risperidone] Other (comments)     galactorrhea       REVIEW OF SYSTEMS:    Psychiatric:  normal, intermittent anxiety  Appetite: good-binge eating at times but is doing better. Sleep: good - gets about 8-10 hours per night. Neuro: Denies seizure history. Reports migraine history (r/t to menstrual cycle, takes aleve with benefit). GI: Denies N/V. Reports history of motion sickness occasionally. CV: Denies chest pain. OB/GYN: Currently takes Norinyl (since 2004). All other systems reviewed and are considered negative.     MENTAL STATUS EXAM:     Orientation oriented to time, place and person   Vital Signs (BP,Pulse, Temp) See below (reviewed)   Gait and Station Within normal limits   Abnormal Muscular Movements/Tone/Behavior No EPS, no Tardive Dyskinesia, restless    Relations evasive   General Appearance:  younger than stated age   Language No aphasia or dysarthria   Speech:  normal pitch and normal volume   Thought Processes logical, wnl rate of thoughts - non committed    Thought Associations flight of ideas   Thought Content obsessions   Suicidal Ideations no plan  and no intention   Homicidal Ideations no plan  and no intention   Mood:  euthymic and irritable   Affect:  Irritable at times and odd demeanor   Memory recent  adequate   Memory remote:  adequate   Concentration/Attention:  impaired [employer]   Insight:  fair   Reliability fair   Judgment:  fair     SAFE-T ASSESSMENT:   Risk Factors: past anxiety and depressive symptoms. In the past has fixated on death and suicidal thoughts per mother (in Elementary school). Protective Factors/strengths: Isha   Suicide Thoughts/Plans/Behaviors/Intent: Denies   Assessment of risk/intervention/follow up: Will f/u   Access to guns: Denies     VITALS:     Visit Vitals    /89    Pulse 91    Ht 5' 3\" (1.6 m)    Wt 73 kg (161 lb)    BMI 28.52 kg/m2       PERTINENT DATA:  No visits with results within 2 Day(s) from this visit. Latest known visit with results is:    Office Visit on 08/12/2016   Component Date Value Ref Range Status    VITAMIN D, 25-HYDROXY 08/12/2016 45.4  30.0 - 100.0 ng/mL Final    WBC 08/12/2016 8.4  3.4 - 10.8 x10E3/uL Final    RBC 08/12/2016 4.35  3.77 - 5.28 x10E6/uL Final    HGB 08/12/2016 13.1  11.1 - 15.9 g/dL Final    HCT 08/12/2016 38.6  34.0 - 46.6 % Final    MCV 08/12/2016 89  79 - 97 fL Final    MCH 08/12/2016 30.1  26.6 - 33.0 pg Final    MCHC 08/12/2016 33.9  31.5 - 35.7 g/dL Final    RDW 08/12/2016 13.9  12.3 - 15.4 % Final    PLATELET 65/41/4059 447  150 - 379 x10E3/uL Final    NEUTROPHILS 08/12/2016 58  % Final    Lymphocytes 08/12/2016 34  % Final    MONOCYTES 08/12/2016 6  % Final    EOSINOPHILS 08/12/2016 2  % Final    BASOPHILS 08/12/2016 0  % Final    ABS. NEUTROPHILS 08/12/2016 4.9  1.4 - 7.0 x10E3/uL Final    Abs Lymphocytes 08/12/2016 2.8  0.7 - 3.1 x10E3/uL Final    ABS. MONOCYTES 08/12/2016 0.5  0.1 - 0.9 x10E3/uL Final    ABS. EOSINOPHILS 08/12/2016 0.2  0.0 - 0.4 x10E3/uL Final    ABS. BASOPHILS 08/12/2016 0.0  0.0 - 0.2 x10E3/uL Final    IMMATURE GRANULOCYTES 08/12/2016 0  % Final    ABS. IMM.  GRANS. 08/12/2016 0.0  0.0 - 0.1 x10E3/uL Final    TSH 08/12/2016 3.110  0.450 - 4.500 uIU/mL Final    Glucose 08/12/2016 73  65 - 99 mg/dL Final    BUN 08/12/2016 8 6 - 20 mg/dL Final    Creatinine 08/12/2016 0.57  0.57 - 1.00 mg/dL Final    GFR est non-AA 08/12/2016 124  >59 mL/min/1.73 Final    GFR est AA 08/12/2016 143  >59 mL/min/1.73 Final    BUN/Creatinine ratio 08/12/2016 14  8 - 20 Final    Sodium 08/12/2016 133* 134 - 144 mmol/L Final    Potassium 08/12/2016 3.9  3.5 - 5.2 mmol/L Final    Chloride 08/12/2016 94* 97 - 108 mmol/L Final    CO2 08/12/2016 22  18 - 29 mmol/L Final    Calcium 08/12/2016 8.9  8.7 - 10.2 mg/dL Final    Protein, total 08/12/2016 6.3  6.0 - 8.5 g/dL Final    Albumin 08/12/2016 3.8  3.5 - 5.5 g/dL Final    GLOBULIN, TOTAL 08/12/2016 2.5  1.5 - 4.5 g/dL Final    A-G Ratio 08/12/2016 1.5  1.1 - 2.5 Final    Bilirubin, total 08/12/2016 0.3  0.0 - 1.2 mg/dL Final    Alk.  phosphatase 08/12/2016 64  39 - 117 IU/L Final    AST (SGOT) 08/12/2016 14  0 - 40 IU/L Final    ALT (SGPT) 08/12/2016 11  0 - 32 IU/L Final    Cholesterol, total 08/12/2016 203* 100 - 199 mg/dL Final    Triglyceride 08/12/2016 155* 0 - 149 mg/dL Final    HDL Cholesterol 08/12/2016 37* >39 mg/dL Final    VLDL, calculated 08/12/2016 31  5 - 40 mg/dL Final    LDL, calculated 08/12/2016 135* 0 - 99 mg/dL Final    Specific Gravity 08/12/2016 1.011  1.005 - 1.030 Final    pH (UA) 08/12/2016 7.0  5.0 - 7.5 Final    Color 08/12/2016 Yellow  Yellow Final    Appearance 08/12/2016 Cloudy* Clear Final    Leukocyte Esterase 08/12/2016 Negative  Negative Final    Protein 08/12/2016 Negative  Negative/Trace Final    Glucose 08/12/2016 Negative  Negative Final    Ketone 08/12/2016 Negative  Negative Final    Blood 08/12/2016 Negative  Negative Final    Bilirubin 08/12/2016 Negative  Negative Final    Urobilinogen 08/12/2016 0.2  0.2 - 1.0 mg/dL Final    Nitrites 08/12/2016 Negative  Negative Final    Microscopic Examination 08/12/2016 Comment   Final    Microscopic exam 08/12/2016 See additional order   Final    URINALYSIS REFLEX 08/12/2016 Comment   Final    WBC 08/12/2016 0-5  0 - 5 /hpf Final    RBC 08/12/2016 None seen  0 - 2 /hpf Final    Epithelial cells 08/12/2016 0-10  0 - 10 /hpf Final    Casts 08/12/2016 None seen  None seen /lpf Final    Mucus 08/12/2016 Present  Not Estab. Final    Bacteria 08/12/2016 None seen  None seen/Few Final    INTERPRETATION 08/12/2016 Note   Final       XR Results (most recent): MEDICAL DECISION MAKING:  Problems addressed today:     ICD-10-CM ICD-9-CM    1. Autism spectrum disorder F84.0 299.00    2. Other obsessive-compulsive disorders F42.8 300.3 TSH 3RD GENERATION      LIPID PANEL      CBC WITH AUTOMATED DIFF      METABOLIC PANEL, BASIC   3. Anxiety F41.9 300.00 TSH 3RD GENERATION      LIPID PANEL      CBC WITH AUTOMATED DIFF      METABOLIC PANEL, BASIC   4. Mixed hyperlipidemia E78.2 272.2 LIPID PANEL       Assessment:   Isha Lopez is a 28 y.o. female with autism that presents with intermittent anxiety and ruminations. Pt presents with her mother today. Pt presents to establish care after being referred from [facility] after her provider left the clinic. Pt currently being prescribed; 100mg Luvox, 200mg Lamictal, and 0.5mg Klonopin BID as needed. Pt was taking abilify 5mg but due to insurance changes she had to discontinue it on Aug 2017. Pt and mother have not noticed a difference in behavior or mood since discontinuing it. Will continue these medications as patient is doing well and mood has been stable. Will reevaulate labs since it's been over a year since labs were drawn. Discussed fasting instructions for lipid panel and pt and mother verbalized understanding. In addition, discussed resources for job opportunities. Provided support and encouragement. Plan:   1.  Medication:      Current Outpatient Prescriptions   Medication Sig Dispense Refill    fluvoxaMINE (LUVOX) 100 mg tablet TAKE THREE (3) TABLET(S) EVERY EVENING 90 Tab 2    lamoTRIgine (LAMICTAL) 200 mg tablet TAKE 1 TABLET BY MOUTH DAILY 30 Tab 2    clonazePAM (KLONOPIN) 0.5 mg tablet Take  by mouth two (2) times daily as needed.  multivitamin (ONE A DAY) tablet Take 1 Tab by mouth daily.  norethindrone-ethinyl estradiol (NORINYL 1+35, 28,) 1-35 mg-mcg per tablet Take  by mouth daily. Medication changes made today: cont luvox 100mg depression/anxiety, cont Lamictal 200mg for mood stabilization, cont klonopin 0.5mg BID PRN panic/anxiety. 2. Counseling and coordination of care including instructions for treatment, risks/benefits, risk factor reduction and patient/family education. She agrees with the plan. Patient instructed to call with any side effects, questions or issues. 3. Collateral information  4. Monitor VS and appropriate labs    Follow-up Disposition:  Return in about 3 months (around 5/5/2018).     2/5/2018  Bernadine Suh NP

## 2018-03-14 DIAGNOSIS — F41.9 ANXIETY: Chronic | ICD-10-CM

## 2018-03-14 DIAGNOSIS — F42.8 OTHER OBSESSIVE-COMPULSIVE DISORDERS: ICD-10-CM

## 2018-03-14 RX ORDER — LAMOTRIGINE 200 MG/1
TABLET ORAL
Qty: 30 TAB | Refills: 2 | Status: SHIPPED | OUTPATIENT
Start: 2018-03-14 | End: 2018-06-12 | Stop reason: SDUPTHER

## 2018-03-18 DIAGNOSIS — F42.8 OTHER OBSESSIVE-COMPULSIVE DISORDERS: ICD-10-CM

## 2018-03-18 DIAGNOSIS — F41.9 ANXIETY: Chronic | ICD-10-CM

## 2018-03-19 RX ORDER — FLUVOXAMINE MALEATE 100 MG/1
TABLET, COATED ORAL
Qty: 90 TAB | Refills: 2 | Status: SHIPPED | OUTPATIENT
Start: 2018-03-19 | End: 2018-06-12 | Stop reason: SDUPTHER

## 2018-05-04 LAB
BASOPHILS # BLD AUTO: 0 X10E3/UL (ref 0–0.2)
BASOPHILS NFR BLD AUTO: 0 %
BUN SERPL-MCNC: 10 MG/DL (ref 6–20)
BUN/CREAT SERPL: 13 (ref 9–23)
CALCIUM SERPL-MCNC: 9.4 MG/DL (ref 8.7–10.2)
CHLORIDE SERPL-SCNC: 101 MMOL/L (ref 96–106)
CHOLEST SERPL-MCNC: 216 MG/DL (ref 100–199)
CO2 SERPL-SCNC: 25 MMOL/L (ref 18–29)
CREAT SERPL-MCNC: 0.75 MG/DL (ref 0.57–1)
EOSINOPHIL # BLD AUTO: 0.1 X10E3/UL (ref 0–0.4)
EOSINOPHIL NFR BLD AUTO: 1 %
ERYTHROCYTE [DISTWIDTH] IN BLOOD BY AUTOMATED COUNT: 13.5 % (ref 12.3–15.4)
GFR SERPLBLD CREATININE-BSD FMLA CKD-EPI: 105 ML/MIN/1.73
GFR SERPLBLD CREATININE-BSD FMLA CKD-EPI: 121 ML/MIN/1.73
GLUCOSE SERPL-MCNC: 73 MG/DL (ref 65–99)
HCT VFR BLD AUTO: 41.4 % (ref 34–46.6)
HDLC SERPL-MCNC: 39 MG/DL
HGB BLD-MCNC: 13.5 G/DL (ref 11.1–15.9)
IMM GRANULOCYTES # BLD: 0 X10E3/UL (ref 0–0.1)
IMM GRANULOCYTES NFR BLD: 0 %
LDLC SERPL CALC-MCNC: 135 MG/DL (ref 0–99)
LYMPHOCYTES # BLD AUTO: 3.3 X10E3/UL (ref 0.7–3.1)
LYMPHOCYTES NFR BLD AUTO: 44 %
MCH RBC QN AUTO: 30 PG (ref 26.6–33)
MCHC RBC AUTO-ENTMCNC: 32.6 G/DL (ref 31.5–35.7)
MCV RBC AUTO: 92 FL (ref 79–97)
MONOCYTES # BLD AUTO: 0.5 X10E3/UL (ref 0.1–0.9)
MONOCYTES NFR BLD AUTO: 6 %
NEUTROPHILS # BLD AUTO: 3.7 X10E3/UL (ref 1.4–7)
NEUTROPHILS NFR BLD AUTO: 49 %
PLATELET # BLD AUTO: 336 X10E3/UL (ref 150–379)
POTASSIUM SERPL-SCNC: 4.5 MMOL/L (ref 3.5–5.2)
RBC # BLD AUTO: 4.5 X10E6/UL (ref 3.77–5.28)
SODIUM SERPL-SCNC: 138 MMOL/L (ref 134–144)
TRIGL SERPL-MCNC: 211 MG/DL (ref 0–149)
TSH SERPL DL<=0.005 MIU/L-ACNC: 2.36 UIU/ML (ref 0.45–4.5)
VLDLC SERPL CALC-MCNC: 42 MG/DL (ref 5–40)
WBC # BLD AUTO: 7.6 X10E3/UL (ref 3.4–10.8)

## 2018-05-07 ENCOUNTER — OFFICE VISIT (OUTPATIENT)
Dept: BEHAVIORAL/MENTAL HEALTH CLINIC | Age: 33
End: 2018-05-07

## 2018-05-07 VITALS
HEIGHT: 63 IN | SYSTOLIC BLOOD PRESSURE: 140 MMHG | BODY MASS INDEX: 28 KG/M2 | DIASTOLIC BLOOD PRESSURE: 78 MMHG | HEART RATE: 73 BPM | WEIGHT: 158 LBS

## 2018-05-07 DIAGNOSIS — F41.9 ANXIETY: ICD-10-CM

## 2018-05-07 DIAGNOSIS — F84.0 AUTISM SPECTRUM DISORDER: Primary | ICD-10-CM

## 2018-05-07 DIAGNOSIS — F42.8 OTHER OBSESSIVE-COMPULSIVE DISORDERS: ICD-10-CM

## 2018-05-07 NOTE — MR AVS SNAPSHOT
[facility] [address] [facility] 
[phone] Patient: Estanislado Dakins MRN: [id number] :1985 Visit Information Date & Time Provider Department Dept. Phone Encounter #  
 2018 11:30 AM [address], NP [address] [phone] [id number] Follow-up Instructions Return in about 7 weeks (around 2018). Your Appointments 2018 11:30 AM  
ESTABLISHED PATIENT with [address] NP  
[address] [facility]) Appt Note: 7 week f/u  
 [address] [facility] [address] [address] [address] [address] [address] [facility] Upcoming Health Maintenance Date Due  
 PAP AKA CERVICAL CYTOLOGY 2017 MEDICARE YEARLY EXAM 3/14/2018 Influenza Age 5 to Adult 2018 DTaP/Tdap/Td series (8 - Td) 2027 Allergies as of 2018  Review Complete On: 2018 By: Marci Nephew Severity Noted Reaction Type Reactions Amoxil [Amoxicillin]  2010    Hives Other Medication  2010    Rash Benozyl [employer] Risperdal [Risperidone]  2015    Other (comments)  
 galactorrhea Current Immunizations  Reviewed on 2017 Name Date DTAP Vaccine 3/21/1990, 1986, 1985, 1985, 1985 HIB Vaccine 1987 Hepatitis B Vaccine 2002, 2001, 2001 MMR Vaccine 5/10/1995, 1986 OPV 3/21/1990, 1986, 1985, 1985 TDAP Vaccine 2008 Tdap 2017 Not reviewed this visit You Were Diagnosed With   
  
 Codes Comments Autism spectrum disorder    -  Primary ICD-10-CM: F84.0 ICD-9-CM: 299.00 Anxiety     ICD-10-CM: F41.9 ICD-9-CM: 300.00 Other obsessive-compulsive disorders     ICD-10-CM: F42.8 ICD-9-CM: 300.3 Vitals BP Pulse Height(growth percentile) Weight(growth percentile) BMI OB Status 140/78 73 5' 3\" (1.6 m) 158 lb (71.7 kg) 27.99 kg/m2 Having regular periods Smoking Status Never Smoker Vitals History BMI and BSA Data Body Mass Index Body Surface Area  
 27.99 kg/m 2 1.79 m 2 Preferred Pharmacy Pharmacy Name Phone [id number], [id number] [place] [address] [phone] Your Updated Medication List  
  
   
This list is accurate as of 5/7/18  1:22 PM.  Always use your most recent med list.  
  
  
  
  
 fluvoxaMINE 100 mg tablet Commonly known as:  Rae Khat TAKE THREE (3) TABLET(S) EVERY EVENING KlonoPIN 0.5 mg tablet Generic drug:  clonazePAM  
Take  by mouth two (2) times daily as needed. lamoTRIgine 200 mg tablet Commonly known as: LaMICtal  
TAKE 1 TABLET BY MOUTH DAILY  
  
 multivitamin tablet Commonly known as:  ONE A DAY Take 1 Tab by mouth daily. NORINYL 1/35 (28) 1-35 mg-mcg Tab Generic drug:  norethindrone-ethinyl estradiol Take  by mouth daily. Follow-up Instructions Return in about 7 weeks (around 6/25/2018). Introducing [address]! [facility] introduces Branch patient portal. Now you can access parts of your medical record, email your doctor's office, and request medication refills online. 1. In your internet browser, go to [email or web address] 2. Click on the First Time User? Click Here link in the Sign In box. You will see the New Member Sign Up page. 3. Enter your Branch Access Code exactly as it appears below. You will not need to use this code after youve completed the sign-up process. If you do not sign up before the expiration date, you must request a new code. · Branch Access Code: [email or web address] Expires: 8/5/2018  1:22 PM 
 
4.  Enter the last four digits of your Social Security Number (xxxx) and Date of Birth (mm/dd/yyyy) as indicated and click Submit. You will be taken to the next sign-up page. 5. Create a SLIC games ID. This will be your SLIC games login ID and cannot be changed, so think of one that is secure and easy to remember. 6. Create a [employer]IC games password. You can change your password at any time. 7. Enter your Password Reset Question and Answer. This can be used at a later time if you forget your password. 8. Enter your e-mail address. You will receive e-mail notification when new information is available in [address] 9. Click Sign Up. You can now view and download portions of your medical record. 10. Click the Download Summary menu link to download a portable copy of your medical information. If you have questions, please visit the Frequently Asked Questions section of the SLIC games website. Remember, [employer] games is NOT to be used for urgent needs. For medical emergencies, dial 911. Now available from your iPhone and Android! Please provide this summary of care documentation to your next provider. Your primary care clinician is listed as [phone] If you have any questions after today's visit, please call [phone].

## 2018-06-12 DIAGNOSIS — F41.9 ANXIETY: Chronic | ICD-10-CM

## 2018-06-12 DIAGNOSIS — F42.8 OTHER OBSESSIVE-COMPULSIVE DISORDERS: ICD-10-CM

## 2018-06-12 RX ORDER — LAMOTRIGINE 200 MG/1
TABLET ORAL
Qty: 30 TAB | Refills: 2 | OUTPATIENT
Start: 2018-06-12

## 2018-06-12 RX ORDER — FLUVOXAMINE MALEATE 100 MG/1
TABLET, COATED ORAL
Qty: 90 TAB | Refills: 2 | Status: SHIPPED | OUTPATIENT
Start: 2018-06-12 | End: 2018-09-13 | Stop reason: SDUPTHER

## 2018-06-12 RX ORDER — LAMOTRIGINE 200 MG/1
TABLET ORAL
Qty: 30 TAB | Refills: 2 | Status: SHIPPED | OUTPATIENT
Start: 2018-06-12 | End: 2018-09-13 | Stop reason: SDUPTHER

## 2018-08-13 ENCOUNTER — OFFICE VISIT (OUTPATIENT)
Dept: BEHAVIORAL/MENTAL HEALTH CLINIC | Age: 33
End: 2018-08-13

## 2018-08-13 VITALS
HEIGHT: 63 IN | DIASTOLIC BLOOD PRESSURE: 99 MMHG | BODY MASS INDEX: 28.17 KG/M2 | WEIGHT: 159 LBS | HEART RATE: 95 BPM | SYSTOLIC BLOOD PRESSURE: 124 MMHG

## 2018-08-13 DIAGNOSIS — F42.8 OTHER OBSESSIVE-COMPULSIVE DISORDERS: Primary | ICD-10-CM

## 2018-08-13 DIAGNOSIS — F84.0 AUTISM SPECTRUM DISORDER: ICD-10-CM

## 2018-08-13 NOTE — MR AVS SNAPSHOT
[address] [facility] 
[phone] Patient: Walter Ceron MRN: [id number] :1985 Visit Information Date & Time Provider Department Dept. Phone Encounter #  
 2018  9:00 AM [facility] [facility] [phone] [id number] Follow-up Instructions Return in about 3 months (around 2018). Upcoming Health Maintenance Date Due  
 PAP AKA CERVICAL CYTOLOGY 2017 MEDICARE YEARLY EXAM 3/14/2018 Influenza Age 5 to Adult 2018 DTaP/Tdap/Td series (8 - Td) 2027 Allergies as of 2018  Review Complete On: 2018 By: Franklyn Zhang NP Severity Noted Reaction Type Reactions Amoxil [Amoxicillin]  2010    Hives Other Medication  2010    Rash Benozyl [employer] Risperdal [Risperidone]  2015    Other (comments)  
 galactorrhea Current Immunizations  Reviewed on 2017 Name Date DTAP Vaccine 3/21/1990, 1986, 1985, 1985, 1985 HIB Vaccine 1987 Hepatitis B Vaccine 2002, 2001, 2001 MMR Vaccine 5/10/1995, 1986 OPV 3/21/1990, 1986, 1985, 1985 TDAP Vaccine 2008 Tdap 2017 Not reviewed this visit You Were Diagnosed With   
  
 Codes Comments Other obsessive-compulsive disorders    -  Primary ICD-10-CM: F42.8 ICD-9-CM: 300.3 Autism spectrum disorder     ICD-10-CM: F84.0 ICD-9-CM: 299.00 Vitals BP Pulse Height(growth percentile) Weight(growth percentile) BMI OB Status (!) 124/99 95 5' 3\" (1.6 m) 159 lb (72.1 kg) 28.17 kg/m2 Having regular periods Smoking Status Never Smoker BMI and BSA Data Body Mass Index Body Surface Area  
 28.17 kg/m 2 1.79 m 2 Preferred Pharmacy Pharmacy Name Phone [id number] Ragini, [address] [address] [address] [phone] Your Updated Medication List  
  
   
This list is accurate as of 8/13/18  9:32 AM.  Always use your most recent med list.  
  
  
  
  
 fluvoxaMINE 100 mg tablet Commonly known as:  [place] TAKE THREE (3) TABLET(S) EVERY EVENING KlonoPIN 0.5 mg tablet Generic drug:  clonazePAM  
Take  by mouth two (2) times daily as needed. lamoTRIgine 200 mg tablet Commonly known as: LaMICtal  
TAKE 1 TABLET BY MOUTH DAILY  
  
 multivitamin tablet Commonly known as:  ONE A DAY Take 1 Tab by mouth daily. NORINYL 1/35 (28) 1-35 mg-mcg Tab Generic drug:  norethindrone-ethinyl estradiol Take  by mouth daily. Follow-up Instructions Return in about 3 months (around 11/13/2018). Introducing [facility]! [employer] introduces [employer] patient portal. Now you can access parts of your medical record, email your doctor's office, and request medication refills online. 1. In your internet browser, go to [email or web address] 2. Click on the First Time User? Click Here link in the Sign In box. You will see the New Member Sign Up page. 3. Enter your [employer]i Access Code exactly as it appears below. You will not need to use this code after youve completed the sign-up process. If you do not sign up before the expiration date, you must request a new code. · [employer]i Access Code: [email or web address] Expires: 11/11/2018  9:32 AM 
 
4. Enter the last four digits of your Social Security Number (xxxx) and Date of Birth (mm/dd/yyyy) as indicated and click Submit. You will be taken to the next sign-up page. 5. Create a [employer] ID. This will be your [employer] login ID and cannot be changed, so think of one that is secure and easy to remember. 6. Create a [employer] password. You can change your password at any time. 7. Enter your Password Reset Question and Answer. This can be used at a later time if you forget your password. 8. Enter your e-mail address. You will receive e-mail notification when new information is available in [address] 9. Click Sign Up. You can now view and download portions of your medical record. 10. Click the Download Summary menu link to download a portable copy of your medical information. If you have questions, please visit the Frequently Asked Questions section of the [employer] website. Remember, [employer] is NOT to be used for urgent needs. For medical emergencies, dial 911. Now available from your iPhone and Android! Please provide this summary of care documentation to your next provider. Your primary care clinician is listed as [phone] If you have any questions after today's visit, please call [phone].

## 2018-08-13 NOTE — PROGRESS NOTES
CHIEF COMPLAINT:  Lynn Mendoza is a 35 y.o. female and was seen today for follow-up of psychiatric condition and psychotropic medication management. HPI:    Yaquelin Castillo reports the following psychiatric symptoms:  Anxiety and autism spectrum. The symptoms have been present for months and are of moderate/high severity. The anxiety symptoms occur somewhat more frequently. Pt here with her mother today for follow-up. She is being seen for consistency while her provider is on leave. FAMILY/SOCIAL HX: decreased structure with loss of job    REVIEW OF SYSTEMS:  Psychiatric:  Anxiety, autism spectrum  Appetite:improved   Sleep: increased more than normal, variable since she is not currently working  Neuro: h/o migraines    Visit Vitals    BP (!) 124/99    Pulse 95    Ht 5' 3\" (1.6 m)    Wt 72.1 kg (159 lb)    BMI 28.17 kg/m2       Side Effects:  none    MENTAL STATUS EXAM:   Sensorium  Alert and Oriented x 2   Relations cooperative and vague   Appearance:  age appropriate, casually dressed and younger than stated age   Motor Behavior:  gait stable and within normal limits   Speech:  hypoverbal and normal volume   Thought Process: goal directed   Thought Content free of delusions and free of hallucinations   Suicidal ideations no intention   Homicidal ideations no intention   Mood:  anxious   Affect:  anxious   Memory recent  adequate   Memory remote:  adequate   Concentration:  adequate and impaired   Abstraction:  concrete   Insight:  fair and limited   Reliability fair   Judgment:  fair and limited     MEDICAL DECISION MAKING:  Problems addressed today:    ICD-10-CM ICD-9-CM    1. Other obsessive-compulsive disorders F42.8 300.3    2. Autism spectrum disorder F84.0 299.00        Assessment:   Yaquelin Castillo is responding to treatment overall. Anxiety symptoms are slightly increased. Reviewed current meds and no changes required at this time.  Reviewed psychosocial changes and discussed importance of ongoing structure. Enc her mother to move forward with medicare transportation to increase pt's independence. Also provided community resource sheet for autism spectrum. Provided information re: nutrition and and \"eating rainbow diet\". Reviewed use of klonopin and pt is using prn and starts with 1/2 tab. Provided support and answered questions. Plan:   1. Current Outpatient Prescriptions   Medication Sig Dispense Refill    lamoTRIgine (LAMICTAL) 200 mg tablet TAKE 1 TABLET BY MOUTH DAILY 30 Tab 2    fluvoxaMINE (LUVOX) 100 mg tablet TAKE THREE (3) TABLET(S) EVERY EVENING 90 Tab 2    clonazePAM (KLONOPIN) 0.5 mg tablet Take  by mouth two (2) times daily as needed.  multivitamin (ONE A DAY) tablet Take 1 Tab by mouth daily.  norethindrone-ethinyl estradiol (NORINYL 1+35, 28,) 1-35 mg-mcg per tablet Take  by mouth daily. medication changes made today: cont luvox 300mg depression/anxiety, cont Lamictal 200mg for mood stabilization, cont klonopin 0.5mg BID PRN panic/anxiety    2. Counseling and coordination of care including instructions for treatment, risks/benefits, risk factor reduction and patient/family education. She agrees with the plan. Patient instructed to call with any side effects, questions or issues. 3.  Follow-up Disposition:  Return in about 3 months (around 11/13/2018).      4. Supportive therapy recommended    8/13/2018  Jace Chanel NP

## 2018-09-13 DIAGNOSIS — F42.8 OTHER OBSESSIVE-COMPULSIVE DISORDERS: ICD-10-CM

## 2018-09-13 DIAGNOSIS — F41.9 ANXIETY: Chronic | ICD-10-CM

## 2018-09-13 RX ORDER — FLUVOXAMINE MALEATE 100 MG/1
TABLET, COATED ORAL
Qty: 90 TAB | Refills: 0 | Status: SHIPPED | OUTPATIENT
Start: 2018-09-13 | End: 2018-10-15 | Stop reason: SDUPTHER

## 2018-09-13 RX ORDER — LAMOTRIGINE 200 MG/1
TABLET ORAL
Qty: 30 TAB | Refills: 0 | Status: SHIPPED | OUTPATIENT
Start: 2018-09-13 | End: 2018-10-15 | Stop reason: SDUPTHER

## 2018-10-15 DIAGNOSIS — F42.8 OTHER OBSESSIVE-COMPULSIVE DISORDERS: ICD-10-CM

## 2018-10-15 DIAGNOSIS — F41.9 ANXIETY: Chronic | ICD-10-CM

## 2018-10-15 RX ORDER — LAMOTRIGINE 200 MG/1
TABLET ORAL
Qty: 30 TAB | Refills: 0 | Status: SHIPPED | OUTPATIENT
Start: 2018-10-15 | End: 2018-11-14 | Stop reason: SDUPTHER

## 2018-10-15 RX ORDER — FLUVOXAMINE MALEATE 100 MG/1
TABLET, COATED ORAL
Qty: 90 TAB | Refills: 0 | Status: SHIPPED | OUTPATIENT
Start: 2018-10-15 | End: 2018-11-14 | Stop reason: SDUPTHER

## 2018-11-14 ENCOUNTER — OFFICE VISIT (OUTPATIENT)
Dept: BEHAVIORAL/MENTAL HEALTH CLINIC | Age: 33
End: 2018-11-14

## 2018-11-14 VITALS
WEIGHT: 162 LBS | HEIGHT: 63 IN | SYSTOLIC BLOOD PRESSURE: 133 MMHG | HEART RATE: 103 BPM | DIASTOLIC BLOOD PRESSURE: 73 MMHG | BODY MASS INDEX: 28.7 KG/M2

## 2018-11-14 DIAGNOSIS — F42.9 OBSESSIVE-COMPULSIVE DISORDER, UNSPECIFIED TYPE: Primary | ICD-10-CM

## 2018-11-14 DIAGNOSIS — F42.8 OTHER OBSESSIVE-COMPULSIVE DISORDERS: ICD-10-CM

## 2018-11-14 DIAGNOSIS — F41.9 ANXIETY: ICD-10-CM

## 2018-11-14 DIAGNOSIS — F84.0 AUTISM SPECTRUM DISORDER: ICD-10-CM

## 2018-11-14 RX ORDER — CLONAZEPAM 0.5 MG/1
0.5 TABLET ORAL
Qty: 30 TAB | Refills: 0 | Status: SHIPPED | OUTPATIENT
Start: 2018-11-14 | End: 2020-01-30 | Stop reason: SDUPTHER

## 2018-11-14 RX ORDER — FLUVOXAMINE MALEATE 100 MG/1
300 TABLET, COATED ORAL DAILY
Qty: 90 TAB | Refills: 2 | Status: SHIPPED | OUTPATIENT
Start: 2018-11-14 | End: 2019-01-28

## 2018-11-14 RX ORDER — LAMOTRIGINE 200 MG/1
TABLET ORAL
Qty: 30 TAB | Refills: 2 | Status: SHIPPED | OUTPATIENT
Start: 2018-11-14 | End: 2019-02-13 | Stop reason: SDUPTHER

## 2018-11-14 NOTE — PROGRESS NOTES
CHIEF COMPLAINT:  Lianne Bal is a 35 y.o. female and was seen today for follow-up of psychiatric condition and psychotropic medication management. HPI:    HPI       Mittie Litten reports the following psychiatric symptoms:  agitation and anxiety. The symptoms have been present for years and are of mild severity. The symptoms occur intermittently. Pt reports symptoms have decreased with current tx plan. Pt is here today to discuss her tx plan. Pts score on the PHQ scale was a 2. This indicates minimal depression. 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 REVIEW OF SYSTEMS:  ROS    Psychiatric:  normal  Appetite:good - Gained 4 lbs since last appointment on 5/7/18. Sleep: wants to sleep all the time- pt reports due to being bored. Neuro: Migraine hx  OB/GYN: Denies pregnancy. Visit Vitals  /73   Pulse (!) 103   Ht 5' 3\" (1.6 m)   Wt 73.5 kg (162 lb)   BMI 28.70 kg/m²       Side Effects:  none    MENTAL STATUS EXAM:   Sensorium  oriented to time, place and person   Relations cooperative   Appearance:  casually dressed and younger than stated age   Motor Behavior:  restless   Speech:  normal pitch and normal volume   Thought Process: goal directed   Thought Content free of hallucinations   Suicidal ideations no plan  and no intention   Homicidal ideations no plan  and no intention   Mood:  stable   Affect:  stable   Memory recent  adequate   Memory remote:  adequate   Concentration:  impaired   Abstraction:  concrete   Insight:  fair   Reliability fair   Judgment:  fair     MEDICAL DECISION MAKING:  Problems addressed today:    ICD-10-CM ICD-9-CM    1. Obsessive-compulsive disorder, unspecified type F42.9 300.3    2. Autism spectrum disorder F84.0 299.00    3. Anxiety F41.9 300.00 fluvoxaMINE (LUVOX) 100 mg tablet      lamoTRIgine (LAMICTAL) 200 mg tablet      clonazePAM (KLONOPIN) 0.5 mg tablet   4.  Other obsessive-compulsive disorders F42.8 300.3 fluvoxaMINE (LUVOX) 100 mg tablet      lamoTRIgine (LAMICTAL) 200 mg tablet       Assessment:   Mittie Litten is responding to treatment. Mother present in appointment today. Pt overall good benefit with current tx plan. Pt interested in reducing luvox because she does not like taking so many medications and feels her symptoms are stable. Today, will decrease luvox to 250 mg for 1 month and if tolerating decrease to 200 mg for 1 month and then to 100 mg. Pt would like to continue Lamictal 200 mg. Discussed that if patient was sexual active that her birth control would not provide her with effective birth control due to Lamictal and pt verbalized understanding. Pt denies being sexually active. Pt reports using klonopin 0.5mg 1x a month for severe anxiety with benefit. Will continue Lamictal 200mg daily and klonopin 0.5mg PRN anxiety. Provided support and encouragement. Plan:   1. Current Outpatient Medications   Medication Sig Dispense Refill    fluvoxaMINE (LUVOX) 100 mg tablet Take 3 Tabs by mouth daily. TAKE THREE (3) TABLET(S) EVERY EVENING 90 Tab 2    lamoTRIgine (LAMICTAL) 200 mg tablet TAKE 1 TABLET BY MOUTH EVERY DAY 30 Tab 2    clonazePAM (KLONOPIN) 0.5 mg tablet Take 1 Tab by mouth daily as needed. 30 Tab 0    multivitamin (ONE A DAY) tablet Take 1 Tab by mouth daily.  norethindrone-ethinyl estradiol (NORINYL 1+35, 28,) 1-35 mg-mcg per tablet Take  by mouth daily. medication changes made today:  decrease luvox 250mg depression/anxiety (decrease to 250mg for 1 month, decrease to 200 mg for 1 month if tolerating and 100 mg for 1 month then reevaluate), cont Lamictal 200mg for mood stabilization, cont klonopin 0.5mg BID PRN panic/anxiety (last filled is unable to be noted due to long time frame from last fill). 2.  Counseling and coordination of care including instructions for treatment, risks/benefits, risk factor reduction and patient/family education.  She agrees with the plan. Patient instructed to call with any side effects, questions or issues. 3.  Follow-up Disposition:  Return in about 3 months (around 2/14/2019).     11/14/2018  Celina Lombardo NP

## 2019-01-28 ENCOUNTER — OFFICE VISIT (OUTPATIENT)
Dept: BEHAVIORAL/MENTAL HEALTH CLINIC | Age: 34
End: 2019-01-28

## 2019-01-28 VITALS
BODY MASS INDEX: 29.06 KG/M2 | DIASTOLIC BLOOD PRESSURE: 97 MMHG | HEIGHT: 63 IN | SYSTOLIC BLOOD PRESSURE: 128 MMHG | WEIGHT: 164 LBS | HEART RATE: 76 BPM

## 2019-01-28 DIAGNOSIS — F41.9 ANXIETY: ICD-10-CM

## 2019-01-28 DIAGNOSIS — F42.8 OTHER OBSESSIVE-COMPULSIVE DISORDERS: ICD-10-CM

## 2019-01-28 DIAGNOSIS — F42.9 OBSESSIVE-COMPULSIVE DISORDER, UNSPECIFIED TYPE: Primary | ICD-10-CM

## 2019-01-28 DIAGNOSIS — F84.0 AUTISM SPECTRUM DISORDER: ICD-10-CM

## 2019-01-28 RX ORDER — FLUVOXAMINE MALEATE 100 MG/1
200 TABLET, COATED ORAL DAILY
Qty: 60 TAB | Refills: 2 | Status: SHIPPED | OUTPATIENT
Start: 2019-01-28 | End: 2019-04-30 | Stop reason: SDUPTHER

## 2019-01-28 NOTE — PROGRESS NOTES
CHIEF COMPLAINT:  Marlin Richmond is a 35 y.o. female and was seen today for follow-up of psychiatric condition and psychotropic medication management. HPI:    HPI    Na reports the following psychiatric symptoms:  agitation and anxiety.  The symptoms have been present for years and are of mild severity. The symptoms occur intermittently. Pt reports symptoms have remained stable since last appt. Pt is here today to discuss her tx plan.       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 REVIEW OF SYSTEMS:  ROS    Psychiatric:  normal  Appetite:good- mother reports her eating \"compulsively\". Gained 2 lbs since last appt on 11/14/18. Sleep: good- getting about 8 hours of sleep per night. Also, takes naps when she is off work. Neuro: Migraine hx (denies any recently)    Visit Vitals  BP (!) 128/97   Pulse 76   Ht 5' 3\" (1.6 m)   Wt 74.4 kg (164 lb)   BMI 29.05 kg/m²       Side Effects:  none    MENTAL STATUS EXAM:   Sensorium  oriented to time, place and person   Relations cooperative   Appearance:  casually dressed and younger than stated age   Motor Behavior:  restless   Speech:  normal pitch and normal volume   Thought Process: goal directed   Thought Content free of delusions and free of hallucinations   Suicidal ideations no plan  and no intention   Homicidal ideations no plan  and no intention   Mood:  euthymic   Affect:  euthymic   Memory recent  adequate   Memory remote:  adequate   Concentration:  adequate   Abstraction:  concrete   Insight:  fair   Reliability good   Judgment:  fair     MEDICAL DECISION MAKING:  Problems addressed today:    ICD-10-CM ICD-9-CM    1. Obsessive-compulsive disorder, unspecified type F42.9 300.3    2. Autism spectrum disorder F84.0 299.00    3. Other obsessive-compulsive disorders F42.8 300.3 fluvoxaMINE (LUVOX) 100 mg tablet   4. Anxiety F41.9 300.00 fluvoxaMINE (LUVOX) 100 mg tablet       Assessment:   Ambika Yip is responding to treatment.  Mother present in appointment today. Pt overall good benefit with current tx plan. We were weaning her luvox per patient and mother request to simplify med tx plan. Pt would like to continue 200 mg luvox and possibly wean further in future appointments. Also, will continue Lamictal 200 mg and klonopin 0.5mg PRN daily anxiety. Discussed [id number] as a mean's to a social support group. Pt reports taking klonopin 1x a month for severe anxiety. Discussed that I'm leaving the practice and she can f/u with another provider in this clinic and pt agrees. Provided support and encouragement. Plan:   1. Current Outpatient Medications   Medication Sig Dispense Refill    fluvoxaMINE (LUVOX) 100 mg tablet Take 2 Tabs by mouth daily. 60 Tab 2    lamoTRIgine (LAMICTAL) 200 mg tablet TAKE 1 TABLET BY MOUTH EVERY DAY 30 Tab 2    clonazePAM (KLONOPIN) 0.5 mg tablet Take 1 Tab by mouth daily as needed. 30 Tab 0    multivitamin (ONE A DAY) tablet Take 1 Tab by mouth daily.  norethindrone-ethinyl estradiol (NORINYL 1+35, 28,) 1-35 mg-mcg per tablet Take  by mouth daily. medication changes made today: cont luvox 200mg depression/anxiety/OCD, cont Lamictal 200mg for mood stabilization, cont klonopin 0.5mg BID PRN panic/anxiety (last filled is unable to be noted due to long time frame from last fill). 2.  Counseling and coordination of care including instructions for treatment, risks/benefits, risk factor reduction and patient/family education. She agrees with the plan. Patient instructed to call with any side effects, questions or issues. 3.  Follow-up Disposition:  Return in about 3 months (around 4/28/2019).     1/28/2019  Celina Crespo NP

## 2019-02-13 DIAGNOSIS — F42.8 OTHER OBSESSIVE-COMPULSIVE DISORDERS: ICD-10-CM

## 2019-02-13 DIAGNOSIS — F41.9 ANXIETY: ICD-10-CM

## 2019-02-13 RX ORDER — LAMOTRIGINE 200 MG/1
TABLET ORAL
Qty: 30 TAB | Refills: 2 | Status: SHIPPED | OUTPATIENT
Start: 2019-02-13 | End: 2019-05-15 | Stop reason: SDUPTHER

## 2019-03-22 ENCOUNTER — OFFICE VISIT (OUTPATIENT)
Dept: FAMILY MEDICINE CLINIC | Age: 34
End: 2019-03-22

## 2019-03-22 VITALS
DIASTOLIC BLOOD PRESSURE: 70 MMHG | BODY MASS INDEX: 28.09 KG/M2 | HEART RATE: 73 BPM | RESPIRATION RATE: 22 BRPM | WEIGHT: 164.5 LBS | OXYGEN SATURATION: 99 % | TEMPERATURE: 96.9 F | HEIGHT: 64 IN | SYSTOLIC BLOOD PRESSURE: 110 MMHG

## 2019-03-22 DIAGNOSIS — J06.9 VIRAL UPPER RESPIRATORY TRACT INFECTION: Primary | ICD-10-CM

## 2019-03-22 NOTE — PROGRESS NOTES
Dave Levi  Identified pt with two pt identifiers(name and ). Chief Complaint   Patient presents with    Cold Symptoms     started thursday evening; stated her throat was sore on  night, but is not sore now       1. Have you been to the ER, urgent care clinic since your last visit? Hospitalized since your last visit? No    2. Have you seen or consulted any other health care providers outside of the [address] [place] [address] since your last visit? Include any pap smears or colon screening. No      Would you like to sign up for MyChart today, if you have not already done so? No  If not, would you like information on MyChart, and how to sign up at a later time? No      Medication reconciliation up to date and corrected with patient at this time. Today's provider has been notified of reason for visit, vitals and flowsheets obtained on patients. Reviewed record in preparation for visit, huddled with provider and have obtained necessary documentation.       Health Maintenance Due   Topic    PAP AKA CERVICAL CYTOLOGY     MEDICARE YEARLY EXAM     Influenza Age 5 to Adult        Wt Readings from Last 3 Encounters:   19 164 lb 8 oz (74.6 kg)   19 164 lb (74.4 kg)   18 162 lb (73.5 kg)     Temp Readings from Last 3 Encounters:   19 96.9 °F (36.1 °C) (Oral)   17 98.4 °F (36.9 °C) (Oral)   16 96.3 °F (35.7 °C) (Oral)     BP Readings from Last 3 Encounters:   19 110/70   19 (!) 128/97   18 133/73     Pulse Readings from Last 3 Encounters:   19 73   19 76   18 (!) 103     Vitals:    19 1207   BP: 110/70   Pulse: 73   Resp: 22   Temp: 96.9 °F (36.1 °C)   TempSrc: Oral   SpO2: 99%   Weight: 164 lb 8 oz (74.6 kg)   Height: 5' 4\" (1.626 m)   PainSc:   0 - No pain   LMP: 2019         Learning Assessment:  :     Learning Assessment 2016   PRIMARY LEARNER Patient   PRIMARY LANGUAGE ENGLISH   LEARNER PREFERENCE PRIMARY LISTENING   ANSWERED BY patient    RELATIONSHIP SELF       Depression Screening:  :     3 most recent PHQ Screens 11/14/2018   Little interest or pleasure in doing things Not at all   Feeling down, depressed, irritable, or hopeless Not at all   Total Score PHQ 2 0   Trouble falling or staying asleep, or sleeping too much More than half the days   Feeling tired or having little energy Not at all   Poor appetite, weight loss, or overeating Not at all   Feeling bad about yourself - or that you are a failure or have let yourself or your family down Not at all   Trouble concentrating on things such as school, work, reading, or watching TV Not at all   Moving or speaking so slowly that other people could have noticed; or the opposite being so fidgety that others notice Not at all   Thoughts of being better off dead, or hurting yourself in some way Not at all   PHQ 9 Score 2   How difficult have these problems made it for you to do your work, take care of your home and get along with others Somewhat difficult       Fall Risk Assessment:  :     No flowsheet data found. Abuse Screening:  :     No flowsheet data found. ADL Screening:  :     No flowsheet data found.

## 2019-03-22 NOTE — PROGRESS NOTES
Here with Mom. Works in dining in rehab center. Sxs began with ST Wed this week. Last night nasal congestion. Allegra last night. Today still congested. Visit Vitals  /70 (BP 1 Location: Left arm, BP Patient Position: Sitting)   Pulse 73   Temp 96.9 °F (36.1 °C) (Oral)   Resp 22   Ht 5' 4\" (1.626 m)   Wt 164 lb 8 oz (74.6 kg)   LMP 01/01/2019 (Approximate)   SpO2 99%   BMI 28.24 kg/m²       Patient alert and cooperative. Reviewed above. Mouth - posterior pharynx negative. Neck supple, non tender. Lungs clear. No conjunctival injection. Assessment:  1. Presumed viral URI. Plan:  1. Use zinc lozenges. 2. Call if secondary infection. 3. Given note to return to work in five days. 4. Follow otherwise here prn.

## 2019-03-22 NOTE — LETTER
NOTIFICATION RETURN TO WORK / SCHOOL 
 
3/22/2019 12:40 PM 
 
Ms. [address] [facility] [address] [id number] To Whom It May Concern: 
 
Abran Brewster is currently under the care of Leandro Pierre. She will return to work on: 3/27/19. If there are questions or concerns please have the patient contact our office. Sincerely, Hiwot Encarnacion MD

## 2019-04-30 ENCOUNTER — OFFICE VISIT (OUTPATIENT)
Dept: BEHAVIORAL/MENTAL HEALTH CLINIC | Age: 34
End: 2019-04-30

## 2019-04-30 VITALS
WEIGHT: 168 LBS | HEIGHT: 64 IN | HEART RATE: 78 BPM | SYSTOLIC BLOOD PRESSURE: 130 MMHG | BODY MASS INDEX: 28.68 KG/M2 | DIASTOLIC BLOOD PRESSURE: 83 MMHG

## 2019-04-30 DIAGNOSIS — F84.0 AUTISM SPECTRUM DISORDER: ICD-10-CM

## 2019-04-30 DIAGNOSIS — F42.9 OBSESSIVE-COMPULSIVE DISORDER, UNSPECIFIED TYPE: Primary | ICD-10-CM

## 2019-04-30 DIAGNOSIS — F41.9 ANXIETY: ICD-10-CM

## 2019-04-30 DIAGNOSIS — F42.8 OTHER OBSESSIVE-COMPULSIVE DISORDERS: ICD-10-CM

## 2019-04-30 RX ORDER — FLUVOXAMINE MALEATE 100 MG/1
300 TABLET, COATED ORAL DAILY
Qty: 30 TAB | Refills: 3 | Status: SHIPPED | OUTPATIENT
Start: 2019-04-30 | End: 2019-05-15 | Stop reason: SDUPTHER
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
## 2019-05-05 DIAGNOSIS — F42.8 OTHER OBSESSIVE-COMPULSIVE DISORDERS: ICD-10-CM

## 2019-05-05 DIAGNOSIS — F41.9 ANXIETY: ICD-10-CM

## 2019-05-08 RX ORDER — FLUVOXAMINE MALEATE 100 MG/1
TABLET, COATED ORAL
Qty: 60 TAB | Refills: 2 | OUTPATIENT
Start: 2019-05-08

## 2019-05-15 DIAGNOSIS — F42.8 OTHER OBSESSIVE-COMPULSIVE DISORDERS: ICD-10-CM

## 2019-05-15 DIAGNOSIS — F41.9 ANXIETY: ICD-10-CM

## 2019-05-15 RX ORDER — LAMOTRIGINE 200 MG/1
TABLET ORAL
Qty: 30 TAB | Refills: 2 | Status: SHIPPED | OUTPATIENT
Start: 2019-05-15 | End: 2019-07-10 | Stop reason: SDUPTHER

## 2019-05-15 RX ORDER — FLUVOXAMINE MALEATE 100 MG/1
300 TABLET, COATED ORAL DAILY
Qty: 90 TAB | Refills: 3 | Status: SHIPPED | OUTPATIENT
Start: 2019-05-15 | End: 2019-06-17 | Stop reason: SDUPTHER

## 2019-06-13 DIAGNOSIS — F42.8 OTHER OBSESSIVE-COMPULSIVE DISORDERS: ICD-10-CM

## 2019-06-13 DIAGNOSIS — F41.9 ANXIETY: ICD-10-CM

## 2019-06-13 RX ORDER — LAMOTRIGINE 200 MG/1
TABLET ORAL
Qty: 30 TAB | Refills: 0 | OUTPATIENT
Start: 2019-06-13

## 2019-06-14 DIAGNOSIS — F42.8 OTHER OBSESSIVE-COMPULSIVE DISORDERS: ICD-10-CM

## 2019-06-14 DIAGNOSIS — F41.9 ANXIETY: ICD-10-CM

## 2019-06-14 NOTE — TELEPHONE ENCOUNTER
[facility] pharmacy requesting 90 day Prescription request for 100 mg tablet of Fluvoxamine Maleate.  Patient received 30 days plus 3 refills in May and  Has appt on 7/30/19

## 2019-06-17 RX ORDER — FLUVOXAMINE MALEATE 100 MG/1
300 TABLET, COATED ORAL DAILY
Qty: 270 TAB | Refills: 0 | Status: SHIPPED | OUTPATIENT
Start: 2019-06-17 | End: 2019-09-16 | Stop reason: SDUPTHER

## 2019-07-10 DIAGNOSIS — F41.9 ANXIETY: ICD-10-CM

## 2019-07-10 DIAGNOSIS — F42.8 OTHER OBSESSIVE-COMPULSIVE DISORDERS: ICD-10-CM

## 2019-07-10 RX ORDER — LAMOTRIGINE 200 MG/1
TABLET ORAL
Qty: 30 TAB | Refills: 2 | Status: SHIPPED | OUTPATIENT
Start: 2019-07-10 | End: 2019-09-10 | Stop reason: SDUPTHER

## 2019-07-30 ENCOUNTER — OFFICE VISIT (OUTPATIENT)
Dept: BEHAVIORAL/MENTAL HEALTH CLINIC | Age: 34
End: 2019-07-30

## 2019-07-30 VITALS
HEIGHT: 64 IN | HEART RATE: 89 BPM | DIASTOLIC BLOOD PRESSURE: 83 MMHG | BODY MASS INDEX: 29.19 KG/M2 | WEIGHT: 171 LBS | SYSTOLIC BLOOD PRESSURE: 112 MMHG

## 2019-07-30 DIAGNOSIS — F84.0 AUTISM SPECTRUM DISORDER: ICD-10-CM

## 2019-07-30 DIAGNOSIS — F42.9 OBSESSIVE-COMPULSIVE DISORDER, UNSPECIFIED TYPE: Primary | ICD-10-CM

## 2019-07-30 NOTE — PROGRESS NOTES
CHIEF COMPLAINT:  Steve Diaz is a 29 y.o. female and was seen today for follow-up of psychiatric condition and psychotropic medication management. HPI:    Karol Forrest reports the following psychiatric symptoms:  depression and anxiety. The symptoms have been present for months and are of moderate/low severity. The symptoms occur less frequently and less severely overall. Pt reports she is doing well overall. She has had some intermittent stressors. Both report pt is doing well. They have tried to lower Luvox dose in the past and anxiety symptoms re-emerge. Pt here with her mother to review her treatment plan. PHQ-9: 5, negative depression screen  HAM-A: 6, mild anxiety    FAMILY/SOCIAL HX: work stressors     REVIEW OF SYSTEMS:  Psychiatric:  depression, anxiety  Appetite:good, hx of overeating snack foods  Sleep: good overall, can vary when working, prefers to stay up late and sleep in   Neuro: h/o migraines    Visit Vitals  /83   Pulse 89   Ht 5' 4\" (1.626 m)   Wt 77.6 kg (171 lb)   BMI 29.35 kg/m²       Side Effects:  none, pt's mother asking about weight gain/diabetes as side effects    MENTAL STATUS EXAM:   Sensorium  oriented to time, place and person   Relations cooperative   Appearance:  age appropriate and casually dressed   Motor Behavior:  gait stable and within normal limits   Speech:  normal volume   Thought Process: goal directed   Thought Content free of delusions and free of hallucinations   Suicidal ideations no intention   Homicidal ideations no intention   Mood:  within normal limits   Affect:  wnl   Memory recent  adequate   Memory remote:  adequate   Concentration:  adequate and impaired   Abstraction:  concrete   Insight:  fair and limited   Reliability fair and limited by hx   Judgment:  fair and limited     MEDICAL DECISION MAKING:  Problems addressed today:    ICD-10-CM ICD-9-CM    1. Obsessive-compulsive disorder, unspecified type F42.9 300.3    2.  Autism spectrum disorder F84.0 299.00        Assessment:   Dany is responding to treatment overall. Currently symptoms are well managed. Pt has been on Lamictal and luvox long term. Reviewed potential side effects and health factors. As noted above they have discovered that pt does not luvox 300 mg and anxiety symptoms reoccur at lower dosages. Discussed possibility of lowering lamictal. This was used in the past for agitation/mood swings and pt may be able to tolerate a lower dose. Pt used klonopin prn with benefit and is using as directed. Discussed health and nutrition factors. Provided support and answered questions. Plan:   1. Current Outpatient Medications   Medication Sig Dispense Refill    lamoTRIgine (LAMICTAL) 200 mg tablet TAKE 1 TABLET BY MOUTH EVERY DAY 30 Tab 2    fluvoxaMINE (LUVOX) 100 mg tablet Take 3 Tabs by mouth daily. 270 Tab 0    clonazePAM (KLONOPIN) 0.5 mg tablet Take 1 Tab by mouth daily as needed. 30 Tab 0    multivitamin (ONE A DAY) tablet Take 1 Tab by mouth daily.  norethindrone-ethinyl estradiol (NORINYL 1+35, 28,) 1-35 mg-mcg per tablet Take  by mouth daily. medication changes made today: cont lamictal 200 mg, cont luvox 300 mg, cont klonopin 0.5 mg daily prn    2. Counseling and coordination of care including instructions for treatment, risks/benefits, risk factor reduction and patient/family education. She agrees with the plan. Patient instructed to call with any side effects, questions or issues. 3.    Follow-up and Dispositions    · Return in about 6 months (around 1/30/2020).        4. Cont working with work     7/30/2019  Bismark Trejo NP

## 2019-08-01 DIAGNOSIS — F41.9 ANXIETY: ICD-10-CM

## 2019-08-01 DIAGNOSIS — F42.8 OTHER OBSESSIVE-COMPULSIVE DISORDERS: ICD-10-CM

## 2019-08-01 RX ORDER — LAMOTRIGINE 200 MG/1
TABLET ORAL
Qty: 30 TAB | Refills: 2 | OUTPATIENT
Start: 2019-08-01

## 2019-09-10 DIAGNOSIS — F41.9 ANXIETY: ICD-10-CM

## 2019-09-10 DIAGNOSIS — F42.8 OTHER OBSESSIVE-COMPULSIVE DISORDERS: ICD-10-CM

## 2019-09-10 RX ORDER — LAMOTRIGINE 200 MG/1
TABLET ORAL
Qty: 30 TAB | Refills: 2 | Status: SHIPPED | OUTPATIENT
Start: 2019-09-10 | End: 2019-12-17 | Stop reason: SDUPTHER

## 2019-09-16 DIAGNOSIS — F41.9 ANXIETY: ICD-10-CM

## 2019-09-16 DIAGNOSIS — F42.8 OTHER OBSESSIVE-COMPULSIVE DISORDERS: ICD-10-CM

## 2019-09-16 RX ORDER — FLUVOXAMINE MALEATE 100 MG/1
300 TABLET, COATED ORAL DAILY
Qty: 270 TAB | Refills: 0 | Status: SHIPPED | OUTPATIENT
Start: 2019-09-16 | End: 2020-01-09

## 2019-10-11 DIAGNOSIS — F42.8 OTHER OBSESSIVE-COMPULSIVE DISORDERS: ICD-10-CM

## 2019-10-11 DIAGNOSIS — F41.9 ANXIETY: ICD-10-CM

## 2019-10-14 RX ORDER — LAMOTRIGINE 200 MG/1
TABLET ORAL
Qty: 30 TAB | Refills: 2 | OUTPATIENT
Start: 2019-10-14

## 2019-12-17 ENCOUNTER — OFFICE VISIT (OUTPATIENT)
Dept: FAMILY MEDICINE CLINIC | Age: 34
End: 2019-12-17

## 2019-12-17 VITALS
BODY MASS INDEX: 29.37 KG/M2 | HEART RATE: 106 BPM | DIASTOLIC BLOOD PRESSURE: 109 MMHG | SYSTOLIC BLOOD PRESSURE: 148 MMHG | TEMPERATURE: 97.6 F | RESPIRATION RATE: 18 BRPM | HEIGHT: 64 IN | OXYGEN SATURATION: 95 % | WEIGHT: 172 LBS

## 2019-12-17 DIAGNOSIS — F41.9 ANXIETY: ICD-10-CM

## 2019-12-17 DIAGNOSIS — F42.8 OTHER OBSESSIVE-COMPULSIVE DISORDERS: ICD-10-CM

## 2019-12-17 DIAGNOSIS — J06.9 UPPER RESPIRATORY TRACT INFECTION, UNSPECIFIED TYPE: Primary | ICD-10-CM

## 2019-12-17 DIAGNOSIS — R03.0 ELEVATED BLOOD PRESSURE READING IN OFFICE WITHOUT DIAGNOSIS OF HYPERTENSION: ICD-10-CM

## 2019-12-17 DIAGNOSIS — F84.0 AUTISM SPECTRUM DISORDER: ICD-10-CM

## 2019-12-17 DIAGNOSIS — R68.89 FLU-LIKE SYMPTOMS: ICD-10-CM

## 2019-12-17 LAB
QUICKVUE INFLUENZA TEST: NEGATIVE
VALID INTERNAL CONTROL?: YES

## 2019-12-17 RX ORDER — LAMOTRIGINE 200 MG/1
200 TABLET ORAL DAILY
Qty: 30 TAB | Refills: 1 | Status: SHIPPED | OUTPATIENT
Start: 2019-12-17 | End: 2020-01-30 | Stop reason: SDUPTHER

## 2019-12-17 NOTE — PROGRESS NOTES
Pt here with mother. No fever. Nasal congestion. Cough yesterday. Taking decongestant. Sxs began last Thursday. Using Zicam.  Needs work note. Patient denies chest pain, dyspnea, unexpected weight change, unexpected pain, mood or memory changes. Visit Vitals  BP (!) 148/109   Pulse (!) 106   Temp 97.6 °F (36.4 °C)   Resp 18   Ht 5' 4\" (1.626 m)   Wt 172 lb (78 kg)   SpO2 95%   BMI 29.52 kg/m²     Patient alert and cooperative. Lungs clear. Heart regular. Assessment:  1. URI, flu-like symptoms. Plan:  1. Negative flu test.  2. Use OTC's for symptom relief. 3. Follow up if purulence, fever for antibiotic. 4. Given work note. 5. Recheck here otherwise prn.

## 2019-12-17 NOTE — LETTER
NOTIFICATION RETURN TO WORK / SCHOOL 
 
12/17/2019 12:03 PM 
 
Ms. 40 [phone] To Whom It May Concern: 
 
Nikole Price is currently under the care of Leandro Pierre. She will return to work once current illness resolves. She was seen today and diagnosed with URI. If there are questions or concerns please have the patient contact our office. Sincerely, Nakul Ribera MD

## 2020-01-09 DIAGNOSIS — F42.8 OTHER OBSESSIVE-COMPULSIVE DISORDERS: ICD-10-CM

## 2020-01-09 DIAGNOSIS — F41.9 ANXIETY: ICD-10-CM

## 2020-01-09 RX ORDER — FLUVOXAMINE MALEATE 100 MG/1
TABLET, COATED ORAL
Qty: 270 TAB | Refills: 0 | Status: SHIPPED | OUTPATIENT
Start: 2020-01-09 | End: 2020-01-16 | Stop reason: SDUPTHER

## 2020-01-16 DIAGNOSIS — F41.9 ANXIETY: ICD-10-CM

## 2020-01-16 DIAGNOSIS — F42.8 OTHER OBSESSIVE-COMPULSIVE DISORDERS: ICD-10-CM

## 2020-01-16 RX ORDER — FLUVOXAMINE MALEATE 100 MG/1
TABLET, COATED ORAL
Qty: 270 TAB | Refills: 0 | Status: SHIPPED | OUTPATIENT
Start: 2020-01-16 | End: 2020-05-27 | Stop reason: SDUPTHER

## 2020-01-30 ENCOUNTER — OFFICE VISIT (OUTPATIENT)
Dept: BEHAVIORAL/MENTAL HEALTH CLINIC | Age: 35
End: 2020-01-30

## 2020-01-30 VITALS
BODY MASS INDEX: 28.68 KG/M2 | HEART RATE: 90 BPM | WEIGHT: 168 LBS | HEIGHT: 64 IN | SYSTOLIC BLOOD PRESSURE: 123 MMHG | DIASTOLIC BLOOD PRESSURE: 92 MMHG

## 2020-01-30 DIAGNOSIS — F42.8 OTHER OBSESSIVE-COMPULSIVE DISORDERS: ICD-10-CM

## 2020-01-30 DIAGNOSIS — F84.0 AUTISM SPECTRUM DISORDER: ICD-10-CM

## 2020-01-30 DIAGNOSIS — F41.9 ANXIETY: ICD-10-CM

## 2020-01-30 DIAGNOSIS — F42.9 OBSESSIVE-COMPULSIVE DISORDER, UNSPECIFIED TYPE: Primary | ICD-10-CM

## 2020-01-30 RX ORDER — CLONAZEPAM 0.5 MG/1
0.5 TABLET ORAL
Qty: 30 TAB | Refills: 0 | Status: SHIPPED | OUTPATIENT
Start: 2020-01-30 | End: 2021-10-25 | Stop reason: SDUPTHER

## 2020-01-30 RX ORDER — LAMOTRIGINE 200 MG/1
200 TABLET ORAL DAILY
Qty: 90 TAB | Refills: 1 | Status: SHIPPED | OUTPATIENT
Start: 2020-01-30 | End: 2020-05-27 | Stop reason: SDUPTHER

## 2020-01-30 NOTE — PROGRESS NOTES
CHIEF COMPLAINT:  Noemy Chen is a 29 y.o. female and was seen today for follow-up of psychiatric condition and psychotropic medication management. HPI:    Page Hoyt reports the following psychiatric symptoms:  depression and anxiety. The symptoms have been present for months and are of moderate/high severity. The symptoms occur intermittently but are less severe in general. Pt reports benefit from current medications. She is here today with her mother to review current treatment plan. PHQ-9: 3, negative screen, minimal symptoms  HAM-A: 3, mild anxiety      FAMILY/SOCIAL HX: denies changes or updates    REVIEW OF SYSTEMS:  Psychiatric:  depression, anxiety  Appetite:good, pt printed a rainbow diet guide, has been eating healthier options   Sleep: increased more than normal and no change overall, pt reports when she is off from work she likes to stay up late   Neuro: autism spectrum, h/o migraines     Visit Vitals  BP (!) 123/92 (BP 1 Location: Left arm, BP Patient Position: Sitting)   Pulse 90   Ht 5' 4\" (1.626 m)   Wt 76.2 kg (168 lb)   BMI 28.84 kg/m²       Side Effects:  none    MENTAL STATUS EXAM:   Sensorium  oriented to time, place and person   Relations cooperative and vague   Appearance:  age appropriate, casually dressed and younger than stated age   Motor Behavior:  restless and within normal limits   Speech:  hypoverbal and normal volume   Thought Process: tangential   Thought Content free of delusions and free of hallucinations   Suicidal ideations no intention   Homicidal ideations no intention   Mood:  within normal limits, anxious   Affect:  Wnl, anxious at times   Memory recent  adequate   Memory remote:  adequate   Concentration:  adequate   Abstraction:  abstract   Insight:  fair and limited   Reliability fair and limited   Judgment:  fair and limited     MEDICAL DECISION MAKING:  Problems addressed today:    ICD-10-CM ICD-9-CM    1.  Obsessive-compulsive disorder, unspecified type F42.9 300.3    2. Anxiety F41.9 300.00 lamoTRIgine (LAMICTAL) 200 mg tablet      clonazePAM (KLONOPIN) 0.5 mg tablet   3. Autism spectrum disorder F84.0 299.00    4. Other obsessive-compulsive disorders F42.8 300.3 lamoTRIgine (LAMICTAL) 200 mg tablet       Assessment:   Cari [place] is responding to treatment overall. Currently symptoms are stable. She has some breakthrough symptoms of depression/anxiety but are typically r/t stressors. Mom works with Cari Lake on strategies for stress management with benefit. Reviewed current medications and dosages. No changes required at this time. Pt has not required clonazepam prn use very often but last fill was 2018 so will send a new Rx today. Pt continues to work and mom is encouraging her to increase exercise. Reviewed healthy goals and importance of self care. Plan:   1. Current Outpatient Medications   Medication Sig Dispense Refill    lamoTRIgine (LAMICTAL) 200 mg tablet Take 1 Tab by mouth daily. 90 Tab 1    clonazePAM (KLONOPIN) 0.5 mg tablet Take 1 Tab by mouth daily as needed for Anxiety. 30 Tab 0    fluvoxaMINE (LUVOX) 100 mg tablet TAKE 3 TABLETS BY MOUTH EVERY  Tab 0    multivitamin (ONE A DAY) tablet Take 1 Tab by mouth daily.  norethindrone-ethinyl estradiol (NORINYL 1+35, 28,) 1-35 mg-mcg per tablet Take  by mouth daily. medication changes made today: cont luvox 300mg daily, cont lamictal 200 mg daily, cont clonazepam 0.5 mg daily as needed    2. Counseling and coordination of care including instructions for treatment, risks/benefits, risk factor reduction and patient/family education. She agrees with the plan. Patient instructed to call with any side effects, questions or issues. 3.    Follow-up and Dispositions    · Return in about 3 months (around 4/30/2020).          1/30/2020  Stella Flaherty NP

## 2020-03-01 ENCOUNTER — OFFICE VISIT (OUTPATIENT)
Dept: URGENT CARE | Age: 35
End: 2020-03-01

## 2020-03-01 VITALS
HEIGHT: 64 IN | HEART RATE: 96 BPM | RESPIRATION RATE: 18 BRPM | OXYGEN SATURATION: 98 % | BODY MASS INDEX: 29.02 KG/M2 | TEMPERATURE: 98.2 F | DIASTOLIC BLOOD PRESSURE: 89 MMHG | SYSTOLIC BLOOD PRESSURE: 134 MMHG | WEIGHT: 170 LBS

## 2020-03-01 DIAGNOSIS — J00 ACUTE RHINITIS: Primary | ICD-10-CM

## 2020-03-01 RX ORDER — CETIRIZINE HCL 10 MG
10 TABLET ORAL
Qty: 14 TAB | Refills: 0 | Status: SHIPPED | OUTPATIENT
Start: 2020-03-01 | End: 2020-03-15

## 2020-03-01 RX ORDER — FLUTICASONE PROPIONATE 50 MCG
1 SPRAY, SUSPENSION (ML) NASAL DAILY
Qty: 1 BOTTLE | Refills: 0 | Status: SHIPPED | OUTPATIENT
Start: 2020-03-01 | End: 2022-09-14

## 2020-03-01 NOTE — PROGRESS NOTES
The patient presents with nasal congestion. She had a cough and sore throat since resolved. Her mother does not want her to take decongestants. Her mother wants her to return to work tomorrow. Patient feels like she should not return to work until Wednesday. The history is provided by the patient. History limited by: Autism. Cold Symptoms   The history is provided by the patient. This is a new problem. The current episode started 2 days ago. The problem occurs constantly. The problem has been gradually improving. There has been no fever. Associated symptoms include rhinorrhea. Pertinent negatives include no chills.  Treatments tried: Zicam.        Past Medical History:   Diagnosis Date    Advanced care planning/counseling discussion     Allergic rhinitis     Anxiety and depression     Autism     dr Simone Rodriguez    Galactorrhea 12/2009    dr Tammi Butler- due to risperidone    Headache(784.0)     History of chicken pox     OCD (obsessive compulsive disorder)     Strabismus     Superficial laceration 06/09/2017    on scalp PF [place]-no sutures but got Tdap booster        Past Surgical History:   Procedure Laterality Date    HX HEENT      eye    HX HEENT      tonsils and wisdom teeth     HX HEENT  2007    gum         Family History   Problem Relation Age of Onset    Gout Father     Stroke Maternal Grandmother     Cancer Maternal Grandmother     Heart Disease Maternal Grandfather         Social History     Socioeconomic History    Marital status: SINGLE     Spouse name: Not on file    Number of children: Not on file    Years of education: Not on file    Highest education level: Not on file   Occupational History    Not on file   Social Needs    Financial resource strain: Not on file    Food insecurity:     Worry: Not on file     Inability: Not on file    Transportation needs:     Medical: Not on file     Non-medical: Not on file   Tobacco Use    Smoking status: Never Smoker    Smokeless tobacco: Never Used   Substance and Sexual Activity    Alcohol use: No    Drug use: No    Sexual activity: Yes     Birth control/protection: Pill   Lifestyle    Physical activity:     Days per week: Not on file     Minutes per session: Not on file    Stress: Not on file   Relationships    Social connections:     Talks on phone: Not on file     Gets together: Not on file     Attends Zoroastrian service: Not on file     Active member of club or organization: Not on file     Attends meetings of clubs or organizations: Not on file     Relationship status: Not on file    Intimate partner violence:     Fear of current or ex partner: Not on file     Emotionally abused: Not on file     Physically abused: Not on file     Forced sexual activity: Not on file   Other Topics Concern    Not on file   Social History Narrative    Not on file                ALLERGIES: Amoxil [amoxicillin]; Other medication; Risperdal [risperidone]; and Benzoyl peroxide    Review of Systems   Constitutional: Negative for activity change, appetite change, chills, fatigue and fever. HENT: Positive for congestion, postnasal drip and rhinorrhea. Skin: Positive for color change. Vitals:    03/01/20 1340 03/01/20 1354   BP: (!) 140/100 134/89   Pulse: 96    Resp: 18    Temp: 98.2 °F (36.8 °C)    SpO2: 98%    Weight: 170 lb (77.1 kg)    Height: 5' 4\" (1.626 m)        Physical Exam  Vitals signs reviewed. Constitutional:       General: She is not in acute distress. Appearance: She is well-developed. She is not ill-appearing. HENT:      Head: Normocephalic. Right Ear: Tympanic membrane and ear canal normal. No drainage. Tympanic membrane is not erythematous or bulging. Left Ear: Tympanic membrane and ear canal normal. No drainage. Tympanic membrane is not erythematous or bulging. Nose: Nasal tenderness, congestion and rhinorrhea present. Rhinorrhea is clear. Comments: Redness from drainage and wiping. Mouth/Throat:      Pharynx: No oropharyngeal exudate or posterior oropharyngeal erythema. Cardiovascular:      Rate and Rhythm: Normal rate and regular rhythm. Heart sounds: Normal heart sounds. Pulmonary:      Effort: Pulmonary effort is normal. No respiratory distress. Breath sounds: Normal breath sounds. No decreased breath sounds or rhonchi. Lymphadenopathy:      Cervical: No cervical adenopathy. [facility]    ICD-10-CM ICD-9-CM    1. Acute rhinitis J00 460 fluticasone propionate (FLONASE) 50 mcg/actuation nasal spray      cetirizine (ZYRTEC) 10 mg tablet     Medications Ordered Today   Medications    fluticasone propionate (FLONASE) 50 mcg/actuation nasal spray     [place] by Both Nostrils route daily. Dispense:  1 Bottle     Refill:  0    cetirizine (ZYRTEC) 10 mg tablet     Sig: Take 1 Tab by mouth daily as needed for Allergies for up to 14 days. Dispense:  14 Tab     Refill:  0     No results found for any visits on 20. The patients condition was discussed with the patient and they understand. The patient is to follow up with primary care doctor. If signs and symptoms become worse the pt is to go to the ER. The patient is to take medications as prescribed.      Procedures

## 2020-03-01 NOTE — LETTER
[address] 
[facility] [place] [address] 
[phone] Work/School Note Date: 3/1/2020 To Whom It May concern: 
 
Carlota Young was seen and treated today in the urgent care center by the following provider(s): 
No providers found. Carlota Young may return to work on 3/4/20. Sincerely, Alida Schmidt NP

## 2020-03-01 NOTE — PATIENT INSTRUCTIONS
Rhinitis: Care Instructions  Your Care Instructions  Rhinitis is swelling and irritation in the nose. Allergies and infections are often the cause. Your nose may run or feel stuffy. Other symptoms are itchy and sore eyes, ears, throat, and mouth. If allergies are the cause, your doctor may do tests to find out what you are allergic to. You may be able to stop symptoms if you avoid the things that cause them. Your doctor may suggest or prescribe medicine to ease your symptoms. Follow-up care is a key part of your treatment and safety. Be sure to make and go to all appointments, and call your doctor if you are having problems. It's also a good idea to know your test results and keep a list of the medicines you take. How can you care for yourself at home? · If your rhinitis is caused by allergies, try to find out what sets off (triggers) your symptoms. Take steps to avoid these triggers. ? Avoid yard work. It can stir up both pollen and mold. ? Do not smoke or allow others to smoke around you. If you need help quitting, talk to your doctor about stop-smoking programs and medicines. These can increase your chances of quitting for good. ? Do not use aerosol sprays, cleaning products, or perfumes. ? If pollen is one of your triggers, close your house and car windows during blooming season. ? Clean your house often to control dust.  ? Keep pets outside. · If your doctor recommends over-the-counter medicines to relieve symptoms, take your medicines exactly as prescribed. Call your doctor if you think you are having a problem with your medicine. · Use saline (saltwater) nasal washes to help keep your nasal passages open and wash out mucus and bacteria. You can buy saline nose drops at a grocery store or drugstore. Or you can make your own at home by adding 1 teaspoon of salt and 1 teaspoon of baking soda to 2 cups of distilled water.  If you make your own, fill a bulb syringe with the solution, insert the tip into your nostril, and squeeze gently. [place]  your nose. When should you call for help? Call your doctor now or seek immediate medical care if:    · You are having trouble breathing.    Watch closely for changes in your health, and be sure to contact your doctor if:    · Mucus from your nose gets thicker (like pus) or has new blood in it.     · You have new or worse symptoms.     · You do not get better as expected. Where can you learn more? Go to http://jude-gilberto.info/. Enter M030 in the search box to learn more about \"Rhinitis: Care Instructions. \"  Current as of: October 21, 2018  Content Version: 12.2  © 6037-0678 [employer]. Care instructions adapted under license by [employer] (which disclaims liability or warranty for this information). If you have questions about a medical condition or this instruction, always ask your healthcare professional. [address] any warranty or liability for your use of this information.

## 2020-05-27 ENCOUNTER — VIRTUAL VISIT (OUTPATIENT)
Dept: BEHAVIORAL/MENTAL HEALTH CLINIC | Age: 35
End: 2020-05-27

## 2020-05-27 DIAGNOSIS — F42.8 OTHER OBSESSIVE-COMPULSIVE DISORDERS: ICD-10-CM

## 2020-05-27 DIAGNOSIS — F41.9 ANXIETY: ICD-10-CM

## 2020-05-27 RX ORDER — FLUVOXAMINE MALEATE 100 MG/1
TABLET, COATED ORAL
Qty: 270 TAB | Refills: 1 | Status: SHIPPED | OUTPATIENT
Start: 2020-05-27 | End: 2021-01-08 | Stop reason: SDUPTHER

## 2020-05-27 RX ORDER — LAMOTRIGINE 200 MG/1
200 TABLET ORAL DAILY
Qty: 90 TAB | Refills: 1 | Status: SHIPPED | OUTPATIENT
Start: 2020-05-27 | End: 2021-01-08 | Stop reason: SDUPTHER

## 2020-05-27 NOTE — PROGRESS NOTES
CHIEF COMPLAINT:  Bev Pritchett is a 28 y.o. female and was seen today for follow-up of psychiatric condition and psychotropic medication management. HPI:    Cheng Morales reports the following psychiatric symptoms:  anxiety. The symptoms have been present for months and are of low/moderate severity. The symptoms occur less frequently and less severely overall. Pt does have chronic symptoms of anxiety at baseline but they are improved with medications. Have tried to wean off of lamictal in the past but pt reported recurrent symptoms. Met with pt via video telehealth for appointment today. Pt's mother present in the background and provided updated information at times. Pt provided verbal consent for Trudy Girard RN, NP student, to participate in session today. REVIEW OF SYSTEMS:  Psychiatric:  anxiety  Appetite:good, reports she is eating less and has lost approx 9 lbs  Sleep: good overall, reports some daytime problems with concentration but reports sleep is pretty good overall    Side Effects:  none    MENTAL STATUS EXAM:   Sensorium  oriented to time, place and person   Relations cooperative   Appearance:  age appropriate and casually dressed   Motor Behavior:  restless and within normal limits   Speech:  soft, monotone   Thought Process: goal directed   Thought Content free of delusions and free of hallucinations   Suicidal ideations no intention   Homicidal ideations no intention   Mood:  anxious   Affect:  anxious   Memory recent  adequate   Memory remote:  adequate   Concentration:  adequate to mildly decreased at times   Abstraction:  concrete   Insight:  fair and limited   Reliability fair   Judgment:  fair and limited     MEDICAL DECISION MAKING:  Problems addressed today:    ICD-10-CM ICD-9-CM    1. Other obsessive-compulsive disorders F42.8 300.3 fluvoxaMINE (LUVOX) 100 mg tablet      lamoTRIgine (LaMICtal) 200 mg tablet   2.  Anxiety F41.9 300.00 fluvoxaMINE (LUVOX) 100 mg tablet      lamoTRIgine (LaMICtal) 200 mg tablet       Assessment:   Dany is responding to treatment overall. Pt remains stable on current medications. Reviewed current meds and dosing. No changes required. Pt reports using clonazepam approx x1 since last OV. Pt using as directed. Discussed possibility that variety is sleep routine could be contributing to decreased concentration. Pt will monitor. Reinforced healthy lifestyle changes she has been working on. Plan:   1. Current Outpatient Medications   Medication Sig Dispense Refill    fluvoxaMINE (LUVOX) 100 mg tablet TAKE 3 TABLETS BY MOUTH EVERY  Tab 1    lamoTRIgine (LaMICtal) 200 mg tablet Take 1 Tab by mouth daily. 90 Tab 1    fluticasone propionate (FLONASE) 50 mcg/actuation nasal spray 1 [place] by Both Nostrils route daily. 1 Bottle 0    clonazePAM (KLONOPIN) 0.5 mg tablet Take 1 Tab by mouth daily as needed for Anxiety. 30 Tab 0    multivitamin (ONE A DAY) tablet Take 1 Tab by mouth daily.  norethindrone-ethinyl estradiol (NORINYL 1+35, 28,) 1-35 mg-mcg per tablet Take  by mouth daily. medication changes: cont luvox 300 mg, cont lamictal 200 mg, cont clonazepam 0.5 mg daily prn    2. Counseling and coordination of care including instructions for treatment, risks/benefits, risk factor reduction and patient/family education. She agrees with the plan. Patient instructed to call with any side effects, questions or issues. 3.    Follow-up and Dispositions    · Return in about 6 months (around 11/27/2020). Saad Robles is a 28 y.o. female who was seen by synchronous (real-time) audio-video technology on 5/27/2020. Consent: Saad Robles, who was seen by synchronous (real-time) audio-video technology, and/or her healthcare decision maker, is aware that this patient-initiated, Telehealth encounter on 5/27/2020 is a billable service, with coverage as determined by her insurance carrier.  She is aware that she may receive a bill and has provided verbal consent to proceed: Yes. We discussed the expected course, resolution and complications of the diagnosis(es) in detail. Medication risks, benefits, costs, interactions, and alternatives were discussed as indicated. I advised her to contact the office if her condition worsens, changes or fails to improve as anticipated. She expressed understanding with the diagnosis(es) and plan. Adam Garcia is a 28 y.o. female who was evaluated by a video visit encounter for concerns as above. Patient identification was verified prior to start of the visit. A caregiver was present when appropriate. Due to this being a TeleHealth encounter (During [facility] public health emergency), evaluation of the following organ systems was limited: Vitals/Constitutional/EENT/Resp/CV/GI//MS/Neuro/Skin/Heme-Lymph-Imm. Pursuant to the emergency declaration under the [facility], [facility]5 waiver authority and the [employer] and [employer] Act, this Virtual  Visit was conducted, with patient's (and/or legal guardian's) consent, to reduce the patient's risk of exposure to COVID-19 and provide necessary medical care. Services were provided through a video synchronous discussion virtually to substitute for in-person clinic visit. Patient and provider were located at their individual homes.       Norm Lees NP  5/27/2020

## 2021-01-08 DIAGNOSIS — F41.9 ANXIETY: ICD-10-CM

## 2021-01-08 DIAGNOSIS — F42.8 OTHER OBSESSIVE-COMPULSIVE DISORDERS: ICD-10-CM

## 2021-01-08 RX ORDER — LAMOTRIGINE 200 MG/1
200 TABLET ORAL DAILY
Qty: 90 TAB | Refills: 0 | Status: SHIPPED | OUTPATIENT
Start: 2021-01-08 | End: 2021-02-02 | Stop reason: SDUPTHER

## 2021-01-08 RX ORDER — FLUVOXAMINE MALEATE 100 MG/1
TABLET, COATED ORAL
Qty: 270 TAB | Refills: 0 | Status: SHIPPED | OUTPATIENT
Start: 2021-01-08 | End: 2021-02-02 | Stop reason: SDUPTHER

## 2021-02-02 ENCOUNTER — VIRTUAL VISIT (OUTPATIENT)
Dept: BEHAVIORAL/MENTAL HEALTH CLINIC | Age: 36
End: 2021-02-02
Payer: MEDICARE

## 2021-02-02 DIAGNOSIS — F42.9 OBSESSIVE-COMPULSIVE DISORDER, UNSPECIFIED TYPE: Primary | ICD-10-CM

## 2021-02-02 DIAGNOSIS — F41.9 ANXIETY: ICD-10-CM

## 2021-02-02 DIAGNOSIS — F42.8 OTHER OBSESSIVE-COMPULSIVE DISORDERS: ICD-10-CM

## 2021-02-02 DIAGNOSIS — F84.0 AUTISM SPECTRUM DISORDER: ICD-10-CM

## 2021-02-02 PROCEDURE — 99213 OFFICE O/P EST LOW 20 MIN: CPT | Performed by: NURSE PRACTITIONER

## 2021-02-02 RX ORDER — FLUVOXAMINE MALEATE 100 MG/1
TABLET, COATED ORAL
Qty: 270 TAB | Refills: 1 | Status: SHIPPED | OUTPATIENT
Start: 2021-02-02 | End: 2021-10-06

## 2021-02-02 RX ORDER — LAMOTRIGINE 200 MG/1
200 TABLET ORAL DAILY
Qty: 90 TAB | Refills: 1 | Status: SHIPPED | OUTPATIENT
Start: 2021-02-02 | End: 2021-10-25 | Stop reason: SDUPTHER

## 2021-02-02 NOTE — PROGRESS NOTES
CHIEF COMPLAINT:  Gretchen Moya is a 28 y.o. female and was seen today for follow-up of psychiatric condition and psychotropic medication management. HPI:    López Laura reports the following psychiatric symptoms by hx:  anxiety. Overall symptoms have been present for months. Currently symptoms are of moderate/high severity. The symptoms occur intermittently. Pt reports medications are beneficial. Symptoms often made worse by stressors. Met with pt via video telehelath for appt today. Pt's mother joined session at the beginning and at the end. FAMILY/SOCIAL HX: covid stressors    REVIEW OF SYSTEMS:  Psychiatric:  anxiety  Appetite:good, has lost 11 lbs   Sleep: good   Neuro: no changes      Side Effects:  none    MENTAL STATUS EXAM:   Sensorium  Alert and Oriented x 2   Relations cooperative, overwhelmed at beginning of appt   Appearance:  age appropriate and casually dressed   Motor Behavior:  gait stable and within normal limits   Speech:  normal volume   Thought Process: goal directed   Thought Content free of delusions and free of hallucinations   Suicidal ideations no intention   Homicidal ideations no intention   Mood:  anxious and sad   Affect:  anxious and sad   Memory recent  adequate   Memory remote:  adequate   Concentration:  adequate   Abstraction:  abstract/concrete   Insight:  fair and limited   Reliability fair   Judgment:  fair     MEDICAL DECISION MAKING:  Problems addressed today:    ICD-10-CM ICD-9-CM    1. Obsessive-compulsive disorder, unspecified type  F42.9 300.3    2. Autism spectrum disorder  F84.0 299.00    3. Other obsessive-compulsive disorders  F42.8 300.3 lamoTRIgine (LaMICtal) 200 mg tablet      fluvoxaMINE (LUVOX) 100 mg tablet   4. Anxiety  F41.9 300.00 lamoTRIgine (LaMICtal) 200 mg tablet      fluvoxaMINE (LUVOX) 100 mg tablet       Assessment:   López Laura is responding to treatment overall. Symptoms are intermittently exacerbated.  Pt reports she has been dealing with intermittent stressors. Discussed current medications and dosages. Pt continues to benefit from current meds. She has used prn klonopin a few times over the past few months. No med changes required. Reviewed treatment goals and target symptoms to monitor for. Reinforced strategies for calming and healthy perspective. Plan:   1. Current Outpatient Medications   Medication Sig Dispense Refill    lamoTRIgine (LaMICtal) 200 mg tablet Take 1 Tab by mouth daily. 90 Tab 1    fluvoxaMINE (LUVOX) 100 mg tablet TAKE 3 TABLETS BY MOUTH EVERY  Tab 1    fluticasone propionate (FLONASE) 50 mcg/actuation nasal spray 1 [place] by Both Nostrils route daily. 1 Bottle 0    clonazePAM (KLONOPIN) 0.5 mg tablet Take 1 Tab by mouth daily as needed for Anxiety. 30 Tab 0    multivitamin (ONE A DAY) tablet Take 1 Tab by mouth daily.  norethindrone-ethinyl estradiol (NORINYL 1+35, 28,) 1-35 mg-mcg per tablet Take  by mouth daily. medication changes made today: cont lamictal, luvox and klonopin    2. Counseling and coordination of care including instructions for treatment, risks/benefits, risk factor reduction and patient/family education. She agrees with the plan. Patient instructed to call with any side effects, questions or issues. 3.    Follow-up and Dispositions    · Return in about 6 months (around 8/2/2021). Mekhi Snell, who was evaluated through a synchronous (real-time) audio-video encounter, and/or her healthcare decision maker, is aware that it is a billable service, with coverage as determined by her insurance carrier. She provided verbal consent to proceed: Yes, and patient identification was verified. It was conducted pursuant to the emergency declaration under the [facility], [address] [place] [address] authority and the [employer] and [employer] Act. A caregiver was present when appropriate. Ability to conduct physical exam was limited. I was in the office. The patient was at home.             2/2/2021  Sara De La Rosa, NP

## 2021-08-17 ENCOUNTER — TELEPHONE (OUTPATIENT)
Dept: BEHAVIORAL/MENTAL HEALTH CLINIC | Age: 36
End: 2021-08-17

## 2021-08-17 NOTE — TELEPHONE ENCOUNTER
Patient mother left voice mail asking when next follow up appointment should be scheduled. Returned call. Phone line was busy. Will attempt later today.

## 2021-10-05 DIAGNOSIS — F41.9 ANXIETY: ICD-10-CM

## 2021-10-05 DIAGNOSIS — F42.8 OTHER OBSESSIVE-COMPULSIVE DISORDERS: ICD-10-CM

## 2021-10-06 RX ORDER — FLUVOXAMINE MALEATE 100 MG/1
TABLET, COATED ORAL
Qty: 270 TABLET | Refills: 1 | Status: SHIPPED | OUTPATIENT
Start: 2021-10-06 | End: 2022-04-04

## 2021-10-09 NOTE — MR AVS SNAPSHOT
[address] 
[phone] Patient: Kimmie Vaughn MRN: [id number] :1985 Visit Information Date & Time Provider Department Dept. Phone Encounter #  
 2018  2:30 PM Adair Dyer NP [address] [facility] [phone] [id number] Follow-up Instructions Return in about 3 months (around 2018). Upcoming Health Maintenance Date Due Influenza Age 5 to Adult 2017 PAP AKA CERVICAL CYTOLOGY 2017 DTaP/Tdap/Td series (8 - Td) 2027 Allergies as of 2018  Review Complete On: 2018 By: Adair Dyer NP Severity Noted Reaction Type Reactions Amoxil [Amoxicillin]  2010    Hives Other Medication  2010    Rash Benozyl [employer] Risperdal [Risperidone]  2015    Other (comments)  
 galactorrhea Current Immunizations  Reviewed on 2017 Name Date DTAP Vaccine 3/21/1990, 1986, 1985, 1985, 1985 HIB Vaccine 1987 Hepatitis B Vaccine 2002, 2001, 2001 MMR Vaccine 5/10/1995, 1986 OPV 3/21/1990, 1986, 1985, 1985 TDAP Vaccine 2008 Tdap 2017 Not reviewed this visit You Were Diagnosed With   
  
 Codes Comments Mixed hyperlipidemia    -  Primary ICD-10-CM: F03.0 ICD-9-CM: 272.2 Other obsessive-compulsive disorders     ICD-10-CM: F42.8 ICD-9-CM: 300.3 Anxiety     ICD-10-CM: F41.9 ICD-9-CM: 300.00 Vitals BP Pulse Height(growth percentile) Weight(growth percentile) BMI OB Status 122/89 91 5' 3\" (1.6 m) 161 lb (73 kg) 28.52 kg/m2 Having regular periods Smoking Status Never Smoker BMI and BSA Data Body Mass Index Body Surface Area 28.52 kg/m 2 1.8 m 2 Preferred Pharmacy Pharmacy Name Phone [id number], [id number] [place] [address] [phone] Your Updated Medication List  
  
   
This list is accurate as of: 2/5/18  3:19 PM.  Always use your most recent med list.  
  
  
  
  
 fluvoxaMINE 100 mg tablet Commonly known as:  [place]  TAKE THREE (3) TABLET(S) EVERY EVENING KlonoPIN 0.5 mg tablet Generic drug:  clonazePAM  
Take  by mouth two (2) times daily as needed. lamoTRIgine 200 mg tablet Commonly known as: LaMICtal  
TAKE 1 TABLET BY MOUTH DAILY  
  
 multivitamin tablet Commonly known as:  ONE A DAY Take 1 Tab by mouth daily. NORINYL 1/35 (28) 1-35 mg-mcg Tab Generic drug:  norethindrone-ethinyl estradiol Take  by mouth daily. We Performed the Following CBC WITH AUTOMATED DIFF [50635 CPT(R)] LIPID PANEL [62819 CPT(R)] METABOLIC PANEL, BASIC [47312 CPT(R)] TSH 3RD GENERATION [11902 CPT(R)] Follow-up Instructions Return in about 3 months (around 5/5/2018). Introducing [facility]! Samina Winn introduces Dynamics patient portal. Now you can access parts of your medical record, email your doctor's office, and request medication refills online. 1. In your internet browser, go to [email or web address] 2. Click on the First Time User? Click Here link in the Sign In box. You will see the New Member Sign Up page. 3. Enter your Dynamics Access Code exactly as it appears below. You will not need to use this code after youve completed the sign-up process. If you do not sign up before the expiration date, you must request a new code. · Dynamics Access Code: [email or web address] Expires: 3/6/2018  2:52 PM 
 
4. Enter the last four digits of your Social Security Number (xxxx) and Date of Birth (mm/dd/yyyy) as indicated and click Submit. You will be taken to the next sign-up page. 5. Create a Dynamics ID.  This will be your Dynamics login ID and cannot be changed, so think of one that is secure and easy to remember. 6. Create a Tour Desk password. You can change your password at any time. 7. Enter your Password Reset Question and Answer. This can be used at a later time if you forget your password. 8. Enter your e-mail address. You will receive e-mail notification when new information is available in [address] 9. Click Sign Up. You can now view and download portions of your medical record. 10. Click the Download Summary menu link to download a portable copy of your medical information. If you have questions, please visit the Frequently Asked Questions section of the [employer] website. Remember, [employer] is NOT to be used for urgent needs. For medical emergencies, dial 911. Now available from your iPhone and Android! Please provide this summary of care documentation to your next provider. Your primary care clinician is listed as [id number] If you have any questions after today's visit, please call [phone]. negative...

## 2021-10-25 ENCOUNTER — OFFICE VISIT (OUTPATIENT)
Dept: BEHAVIORAL/MENTAL HEALTH CLINIC | Age: 36
End: 2021-10-25
Payer: MEDICARE

## 2021-10-25 VITALS
DIASTOLIC BLOOD PRESSURE: 102 MMHG | HEART RATE: 103 BPM | RESPIRATION RATE: 16 BRPM | HEIGHT: 63 IN | SYSTOLIC BLOOD PRESSURE: 137 MMHG | WEIGHT: 172.8 LBS | OXYGEN SATURATION: 97 % | BODY MASS INDEX: 30.62 KG/M2 | TEMPERATURE: 97.3 F

## 2021-10-25 DIAGNOSIS — F42.9 OBSESSIVE-COMPULSIVE DISORDER, UNSPECIFIED TYPE: Primary | ICD-10-CM

## 2021-10-25 DIAGNOSIS — G40.89 OTHER SEIZURES (HCC): ICD-10-CM

## 2021-10-25 DIAGNOSIS — F84.0 AUTISM SPECTRUM DISORDER: ICD-10-CM

## 2021-10-25 DIAGNOSIS — F41.9 ANXIETY: ICD-10-CM

## 2021-10-25 DIAGNOSIS — F42.8 OTHER OBSESSIVE-COMPULSIVE DISORDERS: ICD-10-CM

## 2021-10-25 PROBLEM — R56.9 UNSPECIFIED CONVULSIONS (HCC): Status: ACTIVE | Noted: 2021-10-25

## 2021-10-25 PROCEDURE — G8427 DOCREV CUR MEDS BY ELIG CLIN: HCPCS | Performed by: NURSE PRACTITIONER

## 2021-10-25 PROCEDURE — G8432 DEP SCR NOT DOC, RNG: HCPCS | Performed by: NURSE PRACTITIONER

## 2021-10-25 PROCEDURE — 99213 OFFICE O/P EST LOW 20 MIN: CPT | Performed by: NURSE PRACTITIONER

## 2021-10-25 PROCEDURE — G8417 CALC BMI ABV UP PARAM F/U: HCPCS | Performed by: NURSE PRACTITIONER

## 2021-10-25 RX ORDER — CLONAZEPAM 0.5 MG/1
0.5 TABLET ORAL
Qty: 30 TABLET | Refills: 0 | Status: SHIPPED | OUTPATIENT
Start: 2021-10-25 | End: 2022-04-26 | Stop reason: SDUPTHER

## 2021-10-25 RX ORDER — LAMOTRIGINE 200 MG/1
200 TABLET ORAL DAILY
Qty: 90 TABLET | Refills: 1 | Status: SHIPPED | OUTPATIENT
Start: 2021-10-25 | End: 2022-04-26 | Stop reason: SDUPTHER

## 2021-10-25 NOTE — PROGRESS NOTES
CHIEF COMPLAINT:  Saundra Molina is a 39 y.o. female and was seen today for follow-up of psychiatric condition and psychotropic medication management. HPI:    Sebastian Jones reports the following psychiatric symptoms by hx:  depression and anxiety. Overall symptoms have been present for years. Currently symptoms are of moderate to mod/high severity. The symptoms occur chronically at a mild to moderate level and intermittently at mod/high severity. Pt reports medications are beneficial. Met with pt and her mother for appt today to review current treatment plan. Pt's mother asked about possibility of reducing dose of one of her medications. FAMILY/SOCIAL HX: work stressors    REVIEW OF SYSTEMS:  Psychiatric symptoms being monitored for:  depression, anxiety  Appetite:increased   Sleep: fitful at times  Neuro: denies    Visit Vitals  BP (!) 137/102 (BP 1 Location: Left arm, BP Patient Position: Sitting, BP Cuff Size: Adult)   Pulse (!) 103   Temp 97.3 °F (36.3 °C) (Temporal)   Resp 16   Ht 5' 3\" (1.6 m)   Wt 78.4 kg (172 lb 12.8 oz)   SpO2 97%   BMI 30.61 kg/m²       Side Effects:  none    MENTAL STATUS EXAM:   Sensorium  oriented to time, place and person   Relations cooperative   Appearance:  age appropriate and casually dressed   Motor Behavior:  gait stable and within normal limits   Speech:  increased latency of response and normal volume   Thought Process: goal directed and tangential   Thought Content free of delusions and free of hallucinations   Suicidal ideations no intention   Homicidal ideations no intention   Mood:  anxious and irritable   Affect:  anxious and irritable   Memory recent  adequate   Memory remote:  adequate   Concentration:  adequate/impaired   Abstraction:  abstract and concrete   Insight:  fair   Reliability fair   Judgment:  fair     MEDICAL DECISION MAKING:  Problems addressed today:    ICD-10-CM ICD-9-CM    1. Obsessive-compulsive disorder, unspecified type  F42.9 300.3    2.  Autism spectrum disorder  F84.0 299.00        Assessment:   Didier Basurto is responding to treatment. Symptoms are stable overall. Even with current medications though pt still with some episodic exacerbations. Discussed current medications and dosages. Reviewed possibility of reducing lamictal dose but agreed to continue without changes due to severity of irritability/agitation at times. Reviewed treatment goals and target symptoms to monitor for. Discussed strategies for adjusting perspective and stress management. Pt using klonopin as directed on a prn basis. Plan:   1. Current Outpatient Medications   Medication Sig Dispense Refill    fluvoxaMINE (LUVOX) 100 mg tablet TAKE THREE TABLETS BY MOUTH DAILY 270 Tablet 1    lamoTRIgine (LaMICtal) 200 mg tablet Take 1 Tab by mouth daily. 90 Tab 1    clonazePAM (KLONOPIN) 0.5 mg tablet Take 1 Tab by mouth daily as needed for Anxiety. 30 Tab 0    multivitamin (ONE A DAY) tablet Take 1 Tab by mouth daily.  norethindrone-ethinyl estradiol (NORINYL 1+35, 28,) 1-35 mg-mcg per tablet Take  by mouth daily.  fluticasone propionate (FLONASE) 50 mcg/actuation nasal spray 1 [place] by Both Nostrils route daily. (Patient not taking: Reported on 10/25/2021) 1 Bottle 0          medication changes made today: cont luvox, lamictal and prn klonopin    2. Counseling and coordination of care including instructions for treatment, risks/benefits, risk factor reduction and patient/family education. She agrees with the plan. Patient instructed to call with any side effects, questions or issues. 3.    Follow-up and Dispositions    · Return in about 6 months (around 4/25/2022).          10/25/2021  Samaria Swift NP

## 2021-10-25 NOTE — PROGRESS NOTES
Chief Complaint   Patient presents with    Medication Management     Visit Vitals  BP (!) 137/102 (BP 1 Location: Left arm, BP Patient Position: Sitting, BP Cuff Size: Adult)   Pulse (!) 103   Temp 97.3 °F (36.3 °C) (Temporal)   Resp 16   Ht 5' 3\" (1.6 m)   Wt 78.4 kg (172 lb 12.8 oz)   SpO2 97%   BMI 30.61 kg/m²       Prior to Admission medications    Medication Sig Start Date End Date Taking? Authorizing Provider   fluvoxaMINE (LUVOX) 100 mg tablet TAKE THREE TABLETS BY MOUTH DAILY 10/6/21  Yes Dany Ceja NP   lamoTRIgine (LaMICtal) 200 mg tablet Take 1 Tab by mouth daily. 2/2/21  Yes Dany Ceja NP   clonazePAM (KLONOPIN) 0.5 mg tablet Take 1 Tab by mouth daily as needed for Anxiety. 1/30/20  Yes Dany Ceja NP   multivitamin (ONE A DAY) tablet Take 1 Tab by mouth daily. Yes Provider, Historical   norethindrone-ethinyl estradiol (NORINYL 1+35, 28,) 1-35 mg-mcg per tablet Take  by mouth daily. 1/12/10  Yes Provider, Historical   fluticasone propionate (FLONASE) 50 mcg/actuation nasal spray 1 [place] by Both Nostrils route daily.   Patient not taking: Reported on 10/25/2021 3/1/20   Pantera De La Fuente NP

## 2022-03-19 PROBLEM — R56.9 UNSPECIFIED CONVULSIONS (HCC): Status: ACTIVE | Noted: 2021-10-25

## 2022-03-19 PROBLEM — R03.0 ELEVATED BLOOD PRESSURE READING IN OFFICE WITHOUT DIAGNOSIS OF HYPERTENSION: Status: ACTIVE | Noted: 2019-12-17

## 2022-03-19 PROBLEM — G40.89 OTHER SEIZURES (HCC): Status: ACTIVE | Noted: 2021-10-25

## 2022-04-01 DIAGNOSIS — F42.8 OTHER OBSESSIVE-COMPULSIVE DISORDERS: ICD-10-CM

## 2022-04-01 DIAGNOSIS — F41.9 ANXIETY: ICD-10-CM

## 2022-04-04 RX ORDER — FLUVOXAMINE MALEATE 100 MG/1
TABLET, COATED ORAL
Qty: 270 TABLET | Refills: 1 | Status: SHIPPED | OUTPATIENT
Start: 2022-04-04 | End: 2022-09-14 | Stop reason: SDUPTHER

## 2022-04-26 ENCOUNTER — OFFICE VISIT (OUTPATIENT)
Dept: BEHAVIORAL/MENTAL HEALTH CLINIC | Age: 37
End: 2022-04-26
Payer: MEDICARE

## 2022-04-26 VITALS
OXYGEN SATURATION: 98 % | SYSTOLIC BLOOD PRESSURE: 148 MMHG | DIASTOLIC BLOOD PRESSURE: 106 MMHG | BODY MASS INDEX: 30.65 KG/M2 | HEART RATE: 93 BPM | RESPIRATION RATE: 17 BRPM | WEIGHT: 173 LBS | TEMPERATURE: 97.3 F

## 2022-04-26 DIAGNOSIS — F41.9 ANXIETY: ICD-10-CM

## 2022-04-26 DIAGNOSIS — F84.0 AUTISM SPECTRUM DISORDER: ICD-10-CM

## 2022-04-26 DIAGNOSIS — F42.8 OTHER OBSESSIVE-COMPULSIVE DISORDERS: ICD-10-CM

## 2022-04-26 DIAGNOSIS — F42.9 OBSESSIVE-COMPULSIVE DISORDER, UNSPECIFIED TYPE: Primary | ICD-10-CM

## 2022-04-26 PROCEDURE — G8432 DEP SCR NOT DOC, RNG: HCPCS | Performed by: NURSE PRACTITIONER

## 2022-04-26 PROCEDURE — 99213 OFFICE O/P EST LOW 20 MIN: CPT | Performed by: NURSE PRACTITIONER

## 2022-04-26 PROCEDURE — G8417 CALC BMI ABV UP PARAM F/U: HCPCS | Performed by: NURSE PRACTITIONER

## 2022-04-26 PROCEDURE — G8427 DOCREV CUR MEDS BY ELIG CLIN: HCPCS | Performed by: NURSE PRACTITIONER

## 2022-04-26 RX ORDER — LAMOTRIGINE 200 MG/1
200 TABLET ORAL DAILY
Qty: 90 TABLET | Refills: 1 | Status: SHIPPED | OUTPATIENT
Start: 2022-04-26 | End: 2022-09-14 | Stop reason: SDUPTHER

## 2022-04-26 RX ORDER — CLONAZEPAM 0.5 MG/1
0.5 TABLET ORAL
Qty: 30 TABLET | Refills: 0 | Status: SHIPPED | OUTPATIENT
Start: 2022-04-26 | End: 2022-09-14 | Stop reason: SDUPTHER

## 2022-04-26 NOTE — PROGRESS NOTES
CHIEF COMPLAINT:  Gunjan Sevilla is a 40 y.o. female and was seen today for follow-up of psychiatric condition and psychotropic medication management. HPI:    Jamie Whipple reports the following psychiatric symptoms by hx:  depression, agitation and anxiety. Overall symptoms have been present for years. Currently symptoms are of low severity. The symptoms occur less frequently and severely with current medications. Pt has some breakthrough symptoms of anxiety/irritability but overall symptoms are stable. Pt reports medications are beneficial. Met with pt and her mother for appt today to review current treatment plan. FAMILY/SOCIAL HX: no new psychosocial stressors    REVIEW OF SYSTEMS:  Psychiatric symptoms being monitored for:  depression, anxiety  Appetite:good   Sleep: good   Neuro: denies    Visit Vitals  BP (!) 148/106 (BP 1 Location: Left upper arm, BP Patient Position: Sitting, BP Cuff Size: Adult)   Pulse 93   Temp 97.3 °F (36.3 °C) (Temporal)   Resp 17   Wt 78.5 kg (173 lb)   SpO2 98%   BMI 30.65 kg/m²       Side Effects:  none    MENTAL STATUS EXAM:   Sensorium  oriented to time, place and person   Relations cooperative   Appearance:  age appropriate and casually dressed   Motor Behavior:  gait stable and within normal limits   Speech:  normal volume   Thought Process: goal directed   Thought Content free of delusions and free of hallucinations   Suicidal ideations no intention   Homicidal ideations no intention   Mood:  within normal limits   Affect:  wnl   Memory recent  adequate   Memory remote:  adequate   Concentration:  adequate and impaired   Abstraction:  concrete   Insight:  fair   Reliability fair   Judgment:  fair     MEDICAL DECISION MAKING:  Problems addressed today:    ICD-10-CM ICD-9-CM    1. Obsessive-compulsive disorder, unspecified type  F42.9 300.3    2. Autism spectrum disorder  F84.0 299.00    3.  Other obsessive-compulsive disorders  F42.8 300.3 lamoTRIgine (LaMICtal) 200 mg tablet 4. Anxiety  F41.9 300.00 lamoTRIgine (LaMICtal) 200 mg tablet      clonazePAM (KlonoPIN) 0.5 mg tablet       Assessment:   Everette Mack is responding to treatment. Symptoms are stable. Discussed current medications and dosages. No changes required. Reviewed elevated BP and pt and her mother were able to brainstorm some food changes that may help decrease sodium intake. Pt has used prn klonopin (often 1/2 tab) a few times in between appointments. Reviewed treatment goals and target symptoms to monitor for. Discussed transition of care. Plan:   1. Current Outpatient Medications   Medication Sig Dispense Refill    lamoTRIgine (LaMICtal) 200 mg tablet Take 1 Tablet by mouth daily. 90 Tablet 1    clonazePAM (KlonoPIN) 0.5 mg tablet Take 1 Tablet by mouth daily as needed for Anxiety. 30 Tablet 0    fluvoxaMINE (LUVOX) 100 mg tablet TAKE THREE TABLETS BY MOUTH DAILY 270 Tablet 1    multivitamin (ONE A DAY) tablet Take 1 Tab by mouth daily.  norethindrone-ethinyl estradiol (NORINYL 1+35, 28,) 1-35 mg-mcg per tablet Take  by mouth daily.  fluticasone propionate (FLONASE) 50 mcg/actuation nasal spray 1 [place] by Both Nostrils route daily. (Patient not taking: Reported on 10/25/2021) 1 Bottle 0          medication changes made today: cont luvox, lamictal and prn klonopin    2. Counseling and coordination of care including instructions for treatment, risks/benefits, risk factor reduction and patient/family education. She agrees with the plan. Patient instructed to call with any side effects, questions or issues. 3.    Follow-up and Dispositions    · Return transition.          4/26/2022  Nathanael Juarez NP

## 2022-04-26 NOTE — PROGRESS NOTES
Chief Complaint   Patient presents with    Medication Management     Visit Vitals  BP (!) 148/106 (BP 1 Location: Left upper arm, BP Patient Position: Sitting, BP Cuff Size: Adult)   Pulse 93   Temp 97.3 °F (36.3 °C) (Temporal)   Resp 17   Wt 78.5 kg (173 lb)   SpO2 98%   BMI 30.65 kg/m²     Prior to Admission medications    Medication Sig Start Date End Date Taking? Authorizing Provider   fluvoxaMINE (LUVOX) 100 mg tablet TAKE THREE TABLETS BY MOUTH DAILY 4/4/22  Yes Esperanza Ceja NP   lamoTRIgine (LaMICtal) 200 mg tablet Take 1 Tablet by mouth daily. 10/25/21  Yes Esperanza Ceja NP   clonazePAM (KlonoPIN) 0.5 mg tablet Take 1 Tablet by mouth daily as needed for Anxiety. 10/25/21  Yes Esperanza Ceja NP   multivitamin (ONE A DAY) tablet Take 1 Tab by mouth daily. Yes Provider, Historical   norethindrone-ethinyl estradiol (NORINYL 1+35, 28,) 1-35 mg-mcg per tablet Take  by mouth daily. 1/12/10  Yes Provider, Historical   fluticasone propionate (FLONASE) 50 mcg/actuation nasal spray 1 [place] by Both Nostrils route daily.   Patient not taking: Reported on 10/25/2021 3/1/20   Darwin Cano NP

## 2022-09-14 ENCOUNTER — OFFICE VISIT (OUTPATIENT)
Dept: BEHAVIORAL/MENTAL HEALTH CLINIC | Age: 37
End: 2022-09-14
Payer: MEDICARE

## 2022-09-14 VITALS
RESPIRATION RATE: 17 BRPM | OXYGEN SATURATION: 98 % | HEART RATE: 90 BPM | TEMPERATURE: 97.4 F | SYSTOLIC BLOOD PRESSURE: 130 MMHG | BODY MASS INDEX: 30.87 KG/M2 | HEIGHT: 63 IN | DIASTOLIC BLOOD PRESSURE: 89 MMHG | WEIGHT: 174.2 LBS

## 2022-09-14 DIAGNOSIS — F84.0 AUTISM SPECTRUM DISORDER: Primary | ICD-10-CM

## 2022-09-14 DIAGNOSIS — F42.8 OTHER OBSESSIVE-COMPULSIVE DISORDERS: ICD-10-CM

## 2022-09-14 DIAGNOSIS — F42.9 OBSESSIVE-COMPULSIVE DISORDER, UNSPECIFIED TYPE: ICD-10-CM

## 2022-09-14 DIAGNOSIS — F41.9 ANXIETY: ICD-10-CM

## 2022-09-14 PROCEDURE — G8417 CALC BMI ABV UP PARAM F/U: HCPCS | Performed by: NURSE PRACTITIONER

## 2022-09-14 PROCEDURE — G8427 DOCREV CUR MEDS BY ELIG CLIN: HCPCS | Performed by: NURSE PRACTITIONER

## 2022-09-14 PROCEDURE — 90792 PSYCH DIAG EVAL W/MED SRVCS: CPT | Performed by: NURSE PRACTITIONER

## 2022-09-14 PROCEDURE — G8432 DEP SCR NOT DOC, RNG: HCPCS | Performed by: NURSE PRACTITIONER

## 2022-09-14 RX ORDER — CLONAZEPAM 0.5 MG/1
0.5 TABLET ORAL
Qty: 30 TABLET | Refills: 2 | Status: SHIPPED | OUTPATIENT
Start: 2022-09-14

## 2022-09-14 RX ORDER — FLUVOXAMINE MALEATE 100 MG/1
300 TABLET, COATED ORAL EVERY EVENING
Qty: 270 TABLET | Refills: 1 | Status: SHIPPED | OUTPATIENT
Start: 2022-09-14

## 2022-09-14 RX ORDER — LAMOTRIGINE 200 MG/1
200 TABLET ORAL DAILY
Qty: 90 TABLET | Refills: 1 | Status: SHIPPED | OUTPATIENT
Start: 2022-09-14

## 2022-09-14 NOTE — PROGRESS NOTES
Chief Complaint   Patient presents with    New Patient     Visit Vitals  /89 (BP 1 Location: Right upper arm, BP Patient Position: Sitting, BP Cuff Size: Adult)   Pulse 90   Temp 97.4 °F (36.3 °C) (Temporal)   Resp 17   Ht 5' 3\" (1.6 m)   Wt 79 kg (174 lb 3.2 oz)   SpO2 98%   BMI 30.86 kg/m²     Prior to Admission medications    Medication Sig Start Date End Date Taking? Authorizing Provider   lamoTRIgine (LaMICtal) 200 mg tablet Take 1 Tablet by mouth daily. 4/26/22  Yes Allocca Wilbert Creradha, NP   clonazePAM (KlonoPIN) 0.5 mg tablet Take 1 Tablet by mouth daily as needed for Anxiety. 4/26/22  Yes Allocca Wilbert Crease, NP   fluvoxaMINE (LUVOX) 100 mg tablet TAKE THREE TABLETS BY MOUTH DAILY 4/4/22  Yes Allocca Wilbert Crease, NP   multivitamin (ONE A DAY) tablet Take 1 Tab by mouth daily. Yes Provider, Historical   norethindrone-ethinyl estradiol (ORTHO-NOVUM 1-35 TAB, NORTREL 1-35 TAB) 1-35 mg-mcg tab Take  by mouth daily. 1/12/10  Yes Provider, Historical   fluticasone propionate (FLONASE) 50 mcg/actuation nasal spray 1 [place] by Both Nostrils route daily.   Patient not taking: No sig reported 3/1/20   Alina Rowe NP     3 most recent [facility] 9/14/2022   Little interest or pleasure in doing things Not at all   Feeling down, depressed, irritable, or hopeless Not at all   Total Score PHQ 2 0   Trouble falling or staying asleep, or sleeping too much -   Feeling tired or having little energy -   Poor appetite, weight loss, or overeating -   Feeling bad about yourself - or that you are a failure or have let yourself or your family down -   Trouble concentrating on things such as school, work, reading, or watching TV -   Moving or speaking so slowly that other people could have noticed; or the opposite being so fidgety that others notice -   Thoughts of being better off dead, or hurting yourself in some way -   PHQ 9 Score -   How difficult have these problems made it for you to do your work, take care of your home and get along with others -

## 2022-09-14 NOTE — PROGRESS NOTES
CHIEF COMPLAINT:  Robert Scott is a 40 y.o. female and was seen today for follow-up of psychiatric condition and psychotropic medication management. HPI:    Alexys Melendez reports the following psychiatric symptoms by hx:  depression, agitation and anxiety. Overall symptoms have been present for years. Currently symptoms are of low severity. The symptoms occur less frequently and severely with current medications. Pt reports she is overall symptoms are stable, with some breakthrough anxiety. She is a transfer patient of [facility] and is here to establish care. Pt reports medications are beneficial. Met with pt  for appt today to review current treatment plan. FAMILY/SOCIAL HX: no new psychosocial stressors     REVIEW OF SYSTEMS:  Psychiatric symptoms being monitored for:  depression, anxiety  Appetite:good, but picky eater   Sleep: good, getting approximately 7 to 8 hours of sleep  Neuro: denies    Visit Vitals  /89 (BP 1 Location: Right upper arm, BP Patient Position: Sitting, BP Cuff Size: Adult)   Pulse 90   Temp 97.4 °F (36.3 °C) (Temporal)   Resp 17   Ht 5' 3\" (1.6 m)   Wt 79 kg (174 lb 3.2 oz)   SpO2 98%   BMI 30.86 kg/m²       Side Effects:  none    MENTAL STATUS EXAM:   Sensorium  oriented to time, place and person   Relations cooperative   Appearance:  age appropriate and casually dressed   Motor Behavior:  gait stable and within normal limits   Speech:  normal volume   Thought Process: goal directed   Thought Content free of delusions and free of hallucinations   Suicidal ideations Denies, no intention   Homicidal ideations no intention   Mood:  within normal limits   Affect:  wnl   Memory recent  adequate   Memory remote:  adequate   Concentration:  adequate and impaired   Abstraction:  concrete   Insight:  fair   Reliability fair   Judgment:  fair     MEDICAL DECISION MAKING:  Problems addressed today:    ICD-10-CM ICD-9-CM    1. Autism spectrum disorder  F84.0 299.00       2. Obsessive-compulsive disorder, unspecified type  F42.9 300.3       3. Anxiety  F41.9 300.00 lamoTRIgine (LaMICtal) 200 mg tablet      clonazePAM (KlonoPIN) 0.5 mg tablet      fluvoxaMINE (LUVOX) 100 mg tablet      4. Other obsessive-compulsive disorders  F42.8 300.3 lamoTRIgine (LaMICtal) 200 mg tablet      fluvoxaMINE (LUVOX) 100 mg tablet          Assessment:   Dub Mins is responding to treatment. Symptoms are stable. Reviewed chart, medical hx, psych hx, and PHQ9. She reports she is on new BP medication. She is doing well at work but has some occasional anxiety and denies precipitating or alleviating factors. She is using Clonazepam only as needed and is aware of benzo safety. Mother will call back with BP medication information. Discussed current medications and dosages. No changes required. . Reviewed treatment goals and target symptoms to monitor for. Plan:   1. Current Outpatient Medications   Medication Sig Dispense Refill    lamoTRIgine (LaMICtal) 200 mg tablet Take 1 Tablet by mouth daily. 90 Tablet 1    clonazePAM (KlonoPIN) 0.5 mg tablet Take 1 Tablet by mouth daily as needed for Anxiety. 30 Tablet 2    fluvoxaMINE (LUVOX) 100 mg tablet Take 3 Tablets by mouth every evening. 270 Tablet 1    multivitamin (ONE A DAY) tablet Take 1 Tab by mouth daily. norethindrone-ethinyl estradiol (ORTHO-NOVUM 1-35 TAB, NORTREL 1-35 TAB) 1-35 mg-mcg tab Take  by mouth daily. fluticasone propionate (FLONASE) 50 mcg/actuation nasal spray 1 [place] by Both Nostrils route daily. (Patient not taking: No sig reported) 1 Bottle 0          medication changes made today: Continue Lamictal, Clonazepam, and Luvox     2. Counseling and coordination of care including instructions for treatment, risks/benefits, risk factor reduction and patient/family education. She agrees with the plan. Patient instructed to call with any side effects, questions or issues.             9/14/2022  Darline Liu NP

## 2022-09-15 ENCOUNTER — TELEPHONE (OUTPATIENT)
Dept: BEHAVIORAL/MENTAL HEALTH CLINIC | Age: 37
End: 2022-09-15

## 2022-09-15 NOTE — TELEPHONE ENCOUNTER
Patient's mother called to give the name and dosage of the blood pressure medication the patient is currently taking.  Patient is currently taking Ramipril 2.5mg.

## 2022-12-14 ENCOUNTER — OFFICE VISIT (OUTPATIENT)
Dept: BEHAVIORAL/MENTAL HEALTH CLINIC | Age: 37
End: 2022-12-14
Payer: MEDICARE

## 2022-12-14 VITALS
OXYGEN SATURATION: 98 % | DIASTOLIC BLOOD PRESSURE: 80 MMHG | WEIGHT: 173.4 LBS | RESPIRATION RATE: 16 BRPM | HEART RATE: 80 BPM | BODY MASS INDEX: 30.72 KG/M2 | TEMPERATURE: 97.9 F | HEIGHT: 63 IN | SYSTOLIC BLOOD PRESSURE: 122 MMHG

## 2022-12-14 DIAGNOSIS — F41.9 ANXIETY: ICD-10-CM

## 2022-12-14 DIAGNOSIS — F42.9 OBSESSIVE-COMPULSIVE DISORDER, UNSPECIFIED TYPE: Primary | ICD-10-CM

## 2022-12-14 DIAGNOSIS — F84.0 AUTISM SPECTRUM DISORDER: ICD-10-CM

## 2022-12-14 PROCEDURE — G8427 DOCREV CUR MEDS BY ELIG CLIN: HCPCS | Performed by: NURSE PRACTITIONER

## 2022-12-14 PROCEDURE — 99214 OFFICE O/P EST MOD 30 MIN: CPT | Performed by: NURSE PRACTITIONER

## 2022-12-14 PROCEDURE — G8432 DEP SCR NOT DOC, RNG: HCPCS | Performed by: NURSE PRACTITIONER

## 2022-12-14 PROCEDURE — G8417 CALC BMI ABV UP PARAM F/U: HCPCS | Performed by: NURSE PRACTITIONER

## 2022-12-14 RX ORDER — RAMIPRIL 2.5 MG/1
2.5 CAPSULE ORAL DAILY
COMMUNITY
Start: 2022-12-12

## 2022-12-14 RX ORDER — CLONAZEPAM 0.5 MG/1
0.5 TABLET ORAL
Qty: 30 TABLET | Refills: 2 | Status: SHIPPED | OUTPATIENT
Start: 2022-12-14

## 2022-12-14 NOTE — PROGRESS NOTES
CHIEF COMPLAINT:  Thanh John is a 40 y.o. female and was seen today for follow-up of psychiatric condition and psychotropic medication management. HPI:    Carolina Mai reports the following psychiatric symptoms by hx:  depression, agitation and anxiety. Overall symptoms have been present for years. Currently symptoms are of low severity. The symptoms occur less frequently and severely with current medications. Pt has some breakthrough symptoms of anxiety/irritability but overall symptoms are stable. Pt reports medications are beneficial. Met with pt and her mother for appt today to review current treatment plan. FAMILY/SOCIAL HX: no new psychosocial stressors     REVIEW OF SYSTEMS:  Psychiatric symptoms being monitored for:  depression, anxiety  Appetite:good   Sleep: good   Neuro: denies    There were no vitals taken for this visit. Side Effects:  none    MENTAL STATUS EXAM:   Sensorium  oriented to time, place and person   Relations cooperative   Appearance:  age appropriate and casually dressed   Motor Behavior:  gait stable and within normal limits   Speech:  normal volume   Thought Process: goal directed   Thought Content free of delusions and free of hallucinations   Suicidal ideations no intention, no plan   Homicidal ideations no intention, no plan    Mood:  within normal limits   Affect:  wnl   Memory recent  adequate   Memory remote:  adequate   Concentration:  adequate and impaired   Abstraction:  concrete   Insight:  fair   Reliability fair   Judgment:  fair     MEDICAL DECISION MAKING:  Problems addressed today:    ICD-10-CM ICD-9-CM    1. Obsessive-compulsive disorder, unspecified type  F42.9 300.3       2. Anxiety  F41.9 300.00 clonazePAM (KlonoPIN) 0.5 mg tablet      3. Autism spectrum disorder  F84.0 299.00             Assessment:   Carolina Mai is responding to treatment. Symptoms are stable. She is having a little anxiety surrounding the Holidays and weight.  Symptoms are well controlled with medications. Discussed current medications and dosages. No changes required. Patient uses clonazepam PRN. Reviewed treatment goals and target symptoms to monitor for. Will continue to monitor. Plan:   1. Current Outpatient Medications   Medication Sig Dispense Refill    ramipriL (ALTACE) 2.5 mg capsule Take 2.5 mg by mouth daily. clonazePAM (KlonoPIN) 0.5 mg tablet Take 1 Tablet by mouth daily as needed for Anxiety. 30 Tablet 2    lamoTRIgine (LaMICtal) 200 mg tablet Take 1 Tablet by mouth daily. 90 Tablet 1    fluvoxaMINE (LUVOX) 100 mg tablet Take 3 Tablets by mouth every evening. 270 Tablet 1    multivitamin (ONE A DAY) tablet Take 1 Tab by mouth daily. norethindrone-ethinyl estradiol (ORTHO-NOVUM 1-35 TAB, NORTREL 1-35 TAB) 1-35 mg-mcg tab Take  by mouth daily. medication changes made today: No changes made today    2. Counseling and coordination of care including instructions for treatment, risks/benefits, risk factor reduction and patient/family education. She agrees with the plan. Patient instructed to call with any side effects, questions or issues.          12/14/2022  Harleen Evangelista NP

## 2022-12-14 NOTE — PROGRESS NOTES
Chief Complaint   Patient presents with    Medication Management     Visit Vitals  /80 (BP 1 Location: Left upper arm, BP Patient Position: Sitting, BP Cuff Size: Adult)   Pulse 80   Temp 97.9 °F (36.6 °C) (Oral)   Resp 16   Ht 5' 3\" (1.6 m)   Wt 78.7 kg (173 lb 6.4 oz)   SpO2 98%   BMI 30.72 kg/m²     Provider reviewed depression screening. 1. Have you been to the ER, urgent care clinic since your last visit? Hospitalized since your last visit? No    2. Have you seen or consulted any other health care providers outside of the [address] [place] [address] since your last visit? Include any pap smears or colon screening.  No

## 2023-01-18 ENCOUNTER — OFFICE VISIT (OUTPATIENT)
Dept: FAMILY MEDICINE CLINIC | Age: 38
End: 2023-01-18
Payer: MEDICARE

## 2023-01-18 VITALS
HEIGHT: 64 IN | HEART RATE: 95 BPM | DIASTOLIC BLOOD PRESSURE: 82 MMHG | OXYGEN SATURATION: 99 % | BODY MASS INDEX: 29.91 KG/M2 | RESPIRATION RATE: 18 BRPM | TEMPERATURE: 98.2 F | WEIGHT: 175.2 LBS | SYSTOLIC BLOOD PRESSURE: 110 MMHG

## 2023-01-18 DIAGNOSIS — R73.02 IMPAIRED GLUCOSE TOLERANCE (ORAL): ICD-10-CM

## 2023-01-18 DIAGNOSIS — Z11.59 ENCOUNTER FOR HEPATITIS C SCREENING TEST FOR LOW RISK PATIENT: ICD-10-CM

## 2023-01-18 DIAGNOSIS — Z79.899 LONG-TERM CURRENT USE OF ANTICONVULSANT: ICD-10-CM

## 2023-01-18 DIAGNOSIS — F50.82 AVOIDANT/RESTRICTIVE FOOD INTAKE DISORDER: ICD-10-CM

## 2023-01-18 DIAGNOSIS — F84.0 AUTISM SPECTRUM DISORDER: Chronic | ICD-10-CM

## 2023-01-18 DIAGNOSIS — I10 PRIMARY HYPERTENSION: ICD-10-CM

## 2023-01-18 DIAGNOSIS — E66.9 OBESITY (BMI 30-39.9): Primary | ICD-10-CM

## 2023-01-18 DIAGNOSIS — H61.23 CERUMINOSIS, BILATERAL: ICD-10-CM

## 2023-01-18 DIAGNOSIS — Z76.89 ENCOUNTER TO ESTABLISH CARE WITH NEW DOCTOR: ICD-10-CM

## 2023-01-18 DIAGNOSIS — Z13.220 SCREENING FOR HYPERLIPIDEMIA: ICD-10-CM

## 2023-01-18 PROBLEM — T14.8XXA SUPERFICIAL LACERATION: Status: RESOLVED | Noted: 2017-06-09 | Resolved: 2023-01-18

## 2023-01-18 PROBLEM — R03.0 ELEVATED BLOOD PRESSURE READING IN OFFICE WITHOUT DIAGNOSIS OF HYPERTENSION: Status: RESOLVED | Noted: 2019-12-17 | Resolved: 2023-01-18

## 2023-01-18 PROBLEM — G40.89 OTHER SEIZURES (HCC): Status: RESOLVED | Noted: 2021-10-25 | Resolved: 2023-01-18

## 2023-01-18 PROCEDURE — 3074F SYST BP LT 130 MM HG: CPT | Performed by: STUDENT IN AN ORGANIZED HEALTH CARE EDUCATION/TRAINING PROGRAM

## 2023-01-18 PROCEDURE — G8427 DOCREV CUR MEDS BY ELIG CLIN: HCPCS | Performed by: STUDENT IN AN ORGANIZED HEALTH CARE EDUCATION/TRAINING PROGRAM

## 2023-01-18 PROCEDURE — G8510 SCR DEP NEG, NO PLAN REQD: HCPCS | Performed by: STUDENT IN AN ORGANIZED HEALTH CARE EDUCATION/TRAINING PROGRAM

## 2023-01-18 PROCEDURE — G8417 CALC BMI ABV UP PARAM F/U: HCPCS | Performed by: STUDENT IN AN ORGANIZED HEALTH CARE EDUCATION/TRAINING PROGRAM

## 2023-01-18 PROCEDURE — 99205 OFFICE O/P NEW HI 60 MIN: CPT | Performed by: STUDENT IN AN ORGANIZED HEALTH CARE EDUCATION/TRAINING PROGRAM

## 2023-01-18 PROCEDURE — 3079F DIAST BP 80-89 MM HG: CPT | Performed by: STUDENT IN AN ORGANIZED HEALTH CARE EDUCATION/TRAINING PROGRAM

## 2023-01-18 NOTE — PROGRESS NOTES
Jose Alberto King  40 y.o. female  1985  [address] [facility] [id number]  [address] [facility]       Chief Complaint: establish care  Source: self     Subjective  Jose Alberto King is an 40 y.o. female who presents to establish care for:  no pcp x 5years     ASD/OCD/Anxiety  - klonopin 0.5mg PRN, lamictal 200mg, luvox 300mg  - sees [employer], Delfina Snellen for medication management  - every 4-6months    Seizure Hx  - denies hx of seizure or convulsant behavior    Obesity  - will gorge until point of vomiting with certain foods, malina pizza  - will eat a wider variety of foods with eating out  - some fruits and vegetables, no cooked vegetables, eats some raw vegetables  - eats vegetables 2X daily, fruit 2X daily    Gyn  - [address] [facility], [facility]  - does annual PAP smear with them  - no family history of breast cancer    HTN  - started on ramipril 2.5mg for BP elevation  - does not think she snores, neither does mom  - no night wakenings   - no family hx of early CV disease nor MI      Allergies - reviewed: Allergies   Allergen Reactions    Amoxil [Amoxicillin] Hives    Other Medication Rash     Benozyl Peroxide--Rash    Risperdal [Risperidone] Other (comments)     galactorrhea    Benzoyl Peroxide Rash         Medications - reviewed:   Current Outpatient Medications   Medication Sig    ramipriL (ALTACE) 2.5 mg capsule Take 2.5 mg by mouth daily. clonazePAM (KlonoPIN) 0.5 mg tablet Take 1 Tablet by mouth daily as needed for Anxiety. lamoTRIgine (LaMICtal) 200 mg tablet Take 1 Tablet by mouth daily. fluvoxaMINE (LUVOX) 100 mg tablet Take 3 Tablets by mouth every evening.    multivitamin (ONE A DAY) tablet Take 1 Tab by mouth daily. norethindrone-ethinyl estradiol (ORTHO-NOVUM 1-35 TAB, NORTREL 1-35 TAB) 1-35 mg-mcg tab Take  by mouth daily. No current facility-administered medications for this visit.          Past Medical History - reviewed:  Past Medical History:   Diagnosis Date    Advanced care planning/counseling discussion     Allergic rhinitis     Autism     dr Rachell Booth    Galactorrhea 12/2009    dr Coello Hews- due to risperidone    Headache(784.0)     History of chicken pox     Hypertension     OCD (obsessive compulsive disorder)     Strabismus     Superficial laceration 06/09/2017    on scalp PF [place]-no sutures but got Tdap booster         Past Surgical History - reviewed:   Past Surgical History:   Procedure Laterality Date    HX HEENT      eye    HX HEENT      tonsils and wisdom teeth     HX HEENT  2007    gum         Social History - reviewed:  Social History     Socioeconomic History    Marital status: SINGLE     Spouse name: Not on file    Number of children: Not on file    Years of education: Not on file    Highest education level: Not on file   Occupational History    Not on file   Tobacco Use    Smoking status: Never     Passive exposure: Never    Smokeless tobacco: Never   Vaping Use    Vaping Use: Never used   Substance and Sexual Activity    Alcohol use: No    Drug use: No    Sexual activity: Yes     Birth control/protection: Pill   Other Topics Concern    Not on file   Social History Narrative    Not on file     Social Determinants of Health     Financial Resource Strain: Low Risk     Difficulty of Paying Living Expenses: Not hard at all   Food Insecurity: No Food Insecurity    Worried About Running Out of Food in the Last Year: Never true    Ran Out of Food in the Last Year: Never true   Transportation Needs: Not on file   Physical Activity: Not on file   Stress: Not on file   Social Connections: Not on file   Intimate Partner Violence: Not on file   Housing Stability: Not on file         Family History - reviewed:  Family History   Problem Relation Age of Onset    Depression Mother     Gout Father     Arthritis-rheumatoid Father     Abdominal aortic aneurysm Maternal Aunt     Stroke Maternal Grandmother     Cancer Maternal Grandmother     Heart Disease Maternal Grandfather     Colon Cancer Neg Hx     Breast Cancer Neg Hx          Immunizations - reviewed:   Immunization History   Administered Date(s) Administered    COVID-19, PFIZER PURPLE top, DILUTE for use, (age 15 y+), IM, 30mcg/0.3mL 03/02/2021, 03/24/2021, 11/24/2021    DTAP Vaccine [birth year], 08/12/1986, 03/21/1990    HIB Vaccine 02/27/1987    Hepatitis B Vaccine 04/16/2001, 08/31/2001, 08/20/2002    Influenza, FLUARIX, FLULAVAL, Cathye Kohut (age 10 mo+) AND AFLURIA, (age 1 y+), PF, 0.5mL 10/18/2022    MMR Vaccine 05/13/1986, 05/10/1995    OPV 1985, 1985, 08/12/1986, 03/21/1990    TDAP Vaccine 04/08/2008    Tdap 06/09/2017         Review of Systems   Constitutional:  Negative for chills, fever, malaise/fatigue and weight loss. HENT:  Negative for congestion and sore throat. Eyes:  Negative for blurred vision and double vision. Respiratory:  Negative for cough and shortness of breath. Cardiovascular:  Negative for chest pain and palpitations. Gastrointestinal:  Negative for abdominal pain, constipation, diarrhea, nausea and vomiting. Genitourinary:  Negative for dysuria and urgency. Musculoskeletal:  Negative for falls and myalgias. Skin:  Negative for itching and rash. Neurological:  Negative for dizziness. Endo/Heme/Allergies:  Negative for polydipsia. Psychiatric/Behavioral:  Negative for depression, substance abuse and suicidal ideas. The patient is nervous/anxious. The patient does not have insomnia. Physical Exam  Visit Vitals  /82 (BP 1 Location: Right arm, BP Patient Position: Sitting, BP Cuff Size: Adult)   Pulse 95   Temp 98.2 °F (36.8 °C) (Temporal)   Resp 18   Ht 5' 4\" (1.626 m)   Wt 175 lb 3.2 oz (79.5 kg)   SpO2 99%   BMI 30.07 kg/m²       Physical Exam  Vitals reviewed. Constitutional:       General: She is not in acute distress. Appearance: Normal appearance. She is obese.  She is not ill-appearing, toxic-appearing or diaphoretic. HENT:      Head: Normocephalic and atraumatic. Right Ear: Ear canal and external ear normal. There is impacted cerumen. Left Ear: Ear canal and external ear normal. There is impacted cerumen. Nose: Nose normal. No congestion. Mouth/Throat:      Mouth: Mucous membranes are moist.   Eyes:      Pupils: Pupils are equal, round, and reactive to light. Neck:      Thyroid: No thyroid mass, thyromegaly or thyroid tenderness. Cardiovascular:      Rate and Rhythm: Normal rate and regular rhythm. Pulses: Normal pulses. Heart sounds: Normal heart sounds. No murmur heard. No gallop. Pulmonary:      Effort: Pulmonary effort is normal. No respiratory distress. Breath sounds: Normal breath sounds. No rales. Abdominal:      General: Abdomen is flat. Bowel sounds are normal. There is no distension. Palpations: Abdomen is soft. Musculoskeletal:         General: Normal range of motion. Cervical back: Normal range of motion and neck supple. No rigidity or tenderness. Right lower leg: No edema. Left lower leg: No edema. Lymphadenopathy:      Cervical: No cervical adenopathy. Skin:     General: Skin is warm and dry. Capillary Refill: Capillary refill takes less than 2 seconds. Findings: No bruising. Neurological:      General: No focal deficit present. Mental Status: She is alert and oriented to person, place, and time. Psychiatric:         Attention and Perception: Attention and perception normal. She is attentive. Mood and Affect: Mood is anxious. Mood is not depressed. Affect is not labile or flat. Speech: Speech normal.         Behavior: Behavior normal. Behavior is cooperative. Thought Content: Thought content normal. Thought content does not include homicidal or suicidal ideation. Thought content does not include homicidal or suicidal plan.          Cognition and Memory: Cognition is impaired. Comments: Very talkative     3 most recent PHQ Screens 1/18/2023 1/18/2023 12/14/2022   Little interest or pleasure in doing things Not at all Not at all Not at all   Feeling down, depressed, irritable, or hopeless Not at all Not at all Not at all   Total Score PHQ 2 0 0 0   Trouble falling or staying asleep, or sleeping too much Not at all - -   Feeling tired or having little energy Not at all - -   Poor appetite, weight loss, or overeating Nearly every day - -   Feeling bad about yourself - or that you are a failure or have let yourself or your family down Not at all - -   Trouble concentrating on things such as school, work, reading, or watching TV Not at all - -   Moving or speaking so slowly that other people could have noticed; or the opposite being so fidgety that others notice Not at all - -   Thoughts of being better off dead, or hurting yourself in some way Not at all - -   PHQ 9 Score 3 - -   How difficult have these problems made it for you to do your work, take care of your home and get along with others Not difficult at all - -         GLENN 2/7 1/18/2023   Feeling nervous, anxious, or on edge 1   Not being able to stop or control worrying 0   Worrying too much about different things 0   Trouble relaxing 0   Being so restless that it is hard to sit still 0   Becoming easily annoyed or irritable 0   Feeling afraid as if something awful might happen 0   GLENN-7 Total Score 1         Assessment/Plan    ICD-10-CM ICD-9-CM    1. Obesity (BMI 30-39. 9)  G32.4 846.71 METABOLIC PANEL, COMPREHENSIVE      CBC W/O DIFF      TSH 3RD GENERATION      LIPID PANEL      HEMOGLOBIN A1C WITH EAG      HEMOGLOBIN A1C WITH EAG      LIPID PANEL      TSH 3RD GENERATION      CBC W/O DIFF      METABOLIC PANEL, COMPREHENSIVE      2. Encounter to establish care with new doctor  Z76.89 V65.8       3.  Screening for hyperlipidemia  Z13.220 V77.91 LIPID PANEL      LIPID PANEL      4. Encounter for hepatitis C screening test for low risk patient  Z11.59 V73.89 HEPATITIS C AB      HEPATITIS C AB      5. Avoidant/restrictive food intake disorder   F50.82 307.59 TSH 3RD GENERATION      TSH 3RD GENERATION      6. Impaired glucose tolerance (oral)   R73.02 790.22 HEMOGLOBIN A1C WITH EAG      HEMOGLOBIN A1C WITH EAG      7. Primary hypertension  I10 401.9       8. Long-term current use of anticonvulsant  I70.026 M36.20 METABOLIC PANEL, COMPREHENSIVE      CBC W/O DIFF      9. Ceruminosis, bilateral  H61.23 380.4       10. Autism spectrum disorder  F84.0 299.00           Ann Goetz is an 40 y.o. female with hx of ASD/OCD/Anxiety with medication management with psychiatry, obesity, HTN, longterm anticonvulsant use here today to establish care. She has not seen a PCP in over 5 years awith no recent labwork and it is not monitored with her behavioral health office. She is concerned about her weight so a long discussion regarding nutrition and reducing sugar containing and caloric beverages was had with mom and patient. Total Encounter time: 63 minutes    1. Encounter to establish care with new doctor  - reviewed and updated past medical, surgical, social and family history with patient   - age and condition appropriate labwork ordered    2. Obesity (BMI 30-39.9)  - Discussed health risks of obesity including increased risk of HTN, heart disease, diabetes, anxiety and depression  - counseled on diet, reviewed ADA plate handout  - HEMOGLOBIN A1C WITH EAG  - LIPID PANEL  - TSH 3RD GENERATION  - CBC W/O DIFF  - METABOLIC PANEL, COMPREHENSIVE    3. Screening for hyperlipidemia  - LIPID PANEL    4. Encounter for hepatitis C screening test for low risk patient  - HEPATITIS C AB    5. Avoidant/restrictive food intake disorder: limited proteins, picky with vegetables  - TSH 3RD GENERATION    6.  Impaired glucose tolerance (oral): on previous labs for several years ago and increased risk given weight gain, compulsive eating at times  - HEMOGLOBIN A1C WITH EAG    7. Primary hypertension  - continue ramapril, likely can c/d pending labs and additional BP readings well within range    8. Long-term current use of anticonvulsant  - METABOLIC PANEL, COMPREHENSIVE; Future  - CBC W/O DIFF; Future    9. Ceruminosis, bilateral  - home debrox for 1-2 weeks then RTC for flush if ears still feel full    10. Autism Spectrum Disorder: with anxious/OCD features, followed by psychiatry  - continue current medication regimen, labs checked today    Patient informed to follow up: Follow-up and Dispositions    Return for 1-2 wwks for ear flush if needed otherwise ~3-6month for medicare wellness . I have discussed the diagnosis with the patient and the intended plan as seen in the above orders. Patient verbalized understanding of the plan and agrees with the plan. The patient has received an after-visit summary and questions were answered concerning future plans. I have discussed medication side effects and warnings with the patient as well. Informed patient to return to the office if new symptoms arise.       Rashi Moore MD  [facility]

## 2023-01-18 NOTE — PATIENT INSTRUCTIONS
Use each evening and let the solution sit in the ear for 10-15 minutes. Remove wax you can see. Do not use q tips in the ear.  If you are unsure if the wax buildup has resolved you can come back in to check in the ears in about 2 weeks for an ear exam      Contact the [facility] at [phone]

## 2023-01-18 NOTE — PROGRESS NOTES
Chief Complaint   Patient presents with    Establish Care         1. \"Have you been to the ER, urgent care clinic since your last visit? Hospitalized since your last visit? \" No    2. \"Have you seen or consulted any other health care providers outside of the [address] [place] [address] since your last visit? \" Yes GYN        3. For patients aged 39-70: Has the patient had a colonoscopy / FIT/ Cologuard? NA - based on age      If the patient is female:    4. For patients aged 41-77: Has the patient had a mammogram within the past 2 years? NA - based on age or sex      11. For patients aged 21-65: Has the patient had a pap smear? Yes - Care Gap present.  Rooming MA/LPN to request most recent results         3 most recent PHQ Screens 1/18/2023   Little interest or pleasure in doing things Not at all   Feeling down, depressed, irritable, or hopeless Not at all   Total Score PHQ 2 0   Trouble falling or staying asleep, or sleeping too much -   Feeling tired or having little energy -   Poor appetite, weight loss, or overeating -   Feeling bad about yourself - or that you are a failure or have let yourself or your family down -   Trouble concentrating on things such as school, work, reading, or watching TV -   Moving or speaking so slowly that other people could have noticed; or the opposite being so fidgety that others notice -   Thoughts of being better off dead, or hurting yourself in some way -   PHQ 9 Score -   How difficult have these problems made it for you to do your work, take care of your home and get along with others -       Health Maintenance Due   Topic Date Due    Hepatitis C Screening  Never done    Cervical cancer screen  Never done    Medicare Yearly Exam  03/14/2018    COVID-19 Vaccine (5 - Booster for Barr Peter series) 01/19/2022    Flu Vaccine (1) 08/01/2022

## 2023-01-19 LAB
ALBUMIN SERPL-MCNC: 3.6 G/DL (ref 3.5–5)
ALBUMIN/GLOB SERPL: 1.1 (ref 1.1–2.2)
ALP SERPL-CCNC: 71 U/L (ref 45–117)
ALT SERPL-CCNC: 16 U/L (ref 12–78)
ANION GAP SERPL CALC-SCNC: 6 MMOL/L (ref 5–15)
AST SERPL-CCNC: 9 U/L (ref 15–37)
BILIRUB SERPL-MCNC: 0.2 MG/DL (ref 0.2–1)
BUN SERPL-MCNC: 11 MG/DL (ref 6–20)
BUN/CREAT SERPL: 15 (ref 12–20)
CALCIUM SERPL-MCNC: 9.3 MG/DL (ref 8.5–10.1)
CHLORIDE SERPL-SCNC: 106 MMOL/L (ref 97–108)
CHOLEST SERPL-MCNC: 246 MG/DL
CO2 SERPL-SCNC: 26 MMOL/L (ref 21–32)
CREAT SERPL-MCNC: 0.72 MG/DL (ref 0.55–1.02)
ERYTHROCYTE [DISTWIDTH] IN BLOOD BY AUTOMATED COUNT: 13.7 % (ref 11.5–14.5)
EST. AVERAGE GLUCOSE BLD GHB EST-MCNC: 94 MG/DL
GLOBULIN SER CALC-MCNC: 3.4 G/DL (ref 2–4)
GLUCOSE SERPL-MCNC: 82 MG/DL (ref 65–100)
HBA1C MFR BLD: 4.9 % (ref 4–5.6)
HCT VFR BLD AUTO: 42.1 % (ref 35–47)
HCV AB SERPL QL IA: NONREACTIVE
HDLC SERPL-MCNC: 46 MG/DL
HDLC SERPL: 5.3 (ref 0–5)
HGB BLD-MCNC: 13 G/DL (ref 11.5–16)
LDLC SERPL CALC-MCNC: 163.4 MG/DL (ref 0–100)
MCH RBC QN AUTO: 29.5 PG (ref 26–34)
MCHC RBC AUTO-ENTMCNC: 30.9 G/DL (ref 30–36.5)
MCV RBC AUTO: 95.7 FL (ref 80–99)
NRBC # BLD: 0 K/UL (ref 0–0.01)
NRBC BLD-RTO: 0 PER 100 WBC
PLATELET # BLD AUTO: 345 K/UL (ref 150–400)
PMV BLD AUTO: 10 FL (ref 8.9–12.9)
POTASSIUM SERPL-SCNC: 4.6 MMOL/L (ref 3.5–5.1)
PROT SERPL-MCNC: 7 G/DL (ref 6.4–8.2)
RBC # BLD AUTO: 4.4 M/UL (ref 3.8–5.2)
SODIUM SERPL-SCNC: 138 MMOL/L (ref 136–145)
TRIGL SERPL-MCNC: 183 MG/DL (ref ?–150)
TSH SERPL DL<=0.05 MIU/L-ACNC: 0.59 UIU/ML (ref 0.36–3.74)
VLDLC SERPL CALC-MCNC: 36.6 MG/DL
WBC # BLD AUTO: 6.8 K/UL (ref 3.6–11)

## 2023-04-04 RX ORDER — FLUVOXAMINE MALEATE 100 MG/1
300 TABLET, COATED ORAL EVERY EVENING
Qty: 270 TABLET | Refills: 0 | Status: SHIPPED
Start: 2023-04-04

## 2023-05-03 DIAGNOSIS — F42.8 OTHER OBSESSIVE-COMPULSIVE DISORDERS: ICD-10-CM

## 2023-05-03 DIAGNOSIS — F41.9 ANXIETY: ICD-10-CM

## 2023-05-03 RX ORDER — LAMOTRIGINE 200 MG/1
200 TABLET ORAL DAILY
Qty: 90 TABLET | Refills: 1 | Status: SHIPPED | OUTPATIENT
Start: 2023-05-03

## 2023-05-15 SDOH — ECONOMIC STABILITY: FOOD INSECURITY: WITHIN THE PAST 12 MONTHS, YOU WORRIED THAT YOUR FOOD WOULD RUN OUT BEFORE YOU GOT MONEY TO BUY MORE.: NEVER TRUE

## 2023-05-15 SDOH — ECONOMIC STABILITY: FOOD INSECURITY: WITHIN THE PAST 12 MONTHS, THE FOOD YOU BOUGHT JUST DIDN'T LAST AND YOU DIDN'T HAVE MONEY TO GET MORE.: NEVER TRUE

## 2023-05-15 SDOH — ECONOMIC STABILITY: TRANSPORTATION INSECURITY
IN THE PAST 12 MONTHS, HAS LACK OF TRANSPORTATION KEPT YOU FROM MEETINGS, WORK, OR FROM GETTING THINGS NEEDED FOR DAILY LIVING?: NO

## 2023-05-15 SDOH — ECONOMIC STABILITY: HOUSING INSECURITY
IN THE LAST 12 MONTHS, WAS THERE A TIME WHEN YOU DID NOT HAVE A STEADY PLACE TO SLEEP OR SLEPT IN A SHELTER (INCLUDING NOW)?: NO

## 2023-05-15 SDOH — ECONOMIC STABILITY: INCOME INSECURITY: HOW HARD IS IT FOR YOU TO PAY FOR THE VERY BASICS LIKE FOOD, HOUSING, MEDICAL CARE, AND HEATING?: NOT HARD AT ALL

## 2023-05-16 ENCOUNTER — OFFICE VISIT (OUTPATIENT)
Age: 38
End: 2023-05-16
Payer: MEDICARE

## 2023-05-16 VITALS
OXYGEN SATURATION: 99 % | WEIGHT: 181 LBS | BODY MASS INDEX: 30.9 KG/M2 | HEART RATE: 79 BPM | DIASTOLIC BLOOD PRESSURE: 76 MMHG | TEMPERATURE: 97.9 F | SYSTOLIC BLOOD PRESSURE: 124 MMHG | RESPIRATION RATE: 16 BRPM | HEIGHT: 64 IN

## 2023-05-16 DIAGNOSIS — G40.89 OTHER SEIZURES (HCC): ICD-10-CM

## 2023-05-16 DIAGNOSIS — H61.22 CERUMINOSIS, LEFT: ICD-10-CM

## 2023-05-16 DIAGNOSIS — R51.9 UNILATERAL HEADACHE: Primary | ICD-10-CM

## 2023-05-16 PROBLEM — R56.9 UNSPECIFIED CONVULSIONS (HCC): Status: RESOLVED | Noted: 2021-10-25 | Resolved: 2023-05-16

## 2023-05-16 PROCEDURE — 99214 OFFICE O/P EST MOD 30 MIN: CPT | Performed by: STUDENT IN AN ORGANIZED HEALTH CARE EDUCATION/TRAINING PROGRAM

## 2023-05-16 PROCEDURE — 1036F TOBACCO NON-USER: CPT | Performed by: STUDENT IN AN ORGANIZED HEALTH CARE EDUCATION/TRAINING PROGRAM

## 2023-05-16 PROCEDURE — G8417 CALC BMI ABV UP PARAM F/U: HCPCS | Performed by: STUDENT IN AN ORGANIZED HEALTH CARE EDUCATION/TRAINING PROGRAM

## 2023-05-16 PROCEDURE — G8427 DOCREV CUR MEDS BY ELIG CLIN: HCPCS | Performed by: STUDENT IN AN ORGANIZED HEALTH CARE EDUCATION/TRAINING PROGRAM

## 2023-05-16 PROCEDURE — PBSHW REMOVAL IMPACTED CERUMEN IRRIGATION/LVG UNILAT: Performed by: STUDENT IN AN ORGANIZED HEALTH CARE EDUCATION/TRAINING PROGRAM

## 2023-05-16 PROCEDURE — 69209 REMOVE IMPACTED EAR WAX UNI: CPT | Performed by: STUDENT IN AN ORGANIZED HEALTH CARE EDUCATION/TRAINING PROGRAM

## 2023-05-16 RX ORDER — IBUPROFEN 600 MG/1
600 TABLET ORAL 3 TIMES DAILY PRN
Qty: 30 TABLET | Refills: 0 | Status: SHIPPED | OUTPATIENT
Start: 2023-05-16

## 2023-05-16 SDOH — ECONOMIC STABILITY: FOOD INSECURITY: WITHIN THE PAST 12 MONTHS, THE FOOD YOU BOUGHT JUST DIDN'T LAST AND YOU DIDN'T HAVE MONEY TO GET MORE.: NEVER TRUE

## 2023-05-16 SDOH — ECONOMIC STABILITY: INCOME INSECURITY: HOW HARD IS IT FOR YOU TO PAY FOR THE VERY BASICS LIKE FOOD, HOUSING, MEDICAL CARE, AND HEATING?: NOT HARD AT ALL

## 2023-05-16 SDOH — ECONOMIC STABILITY: FOOD INSECURITY: WITHIN THE PAST 12 MONTHS, YOU WORRIED THAT YOUR FOOD WOULD RUN OUT BEFORE YOU GOT MONEY TO BUY MORE.: NEVER TRUE

## 2023-05-16 NOTE — PROGRESS NOTES
Cheko Gomez  45 y.o. female  1985  [address] [facility] [address]  [address] [facility]       Chief Complaint: headache, ear fullness  Source: self, the medical record    Subjective  Cheko Gomez is an 45 y.o. female who presents for ear fullness and migraine HA. Ear Fullness  - having persistent fullness in her ears, has trialed debrox    Headache  - states had pain over the right side of her head last night and took aleeve and a dose of tylenol along with some tea  - still feels a bit headache-y today  - states the headaches often around her menstrual cycle, typically every few months  - they are often unilateral, last about a few hours to up to one day  - not bothered by smells, + phonophobia, no photophobia; helps to lie in a dark room when she has them  - usually alleviated by aleeve but not always  - she has been getting good sleep and staying well hydrated as well    ROS  Negative except what is mentioned in HPI      Allergies - reviewed: Allergies   Allergen Reactions    Amoxicillin Hives    Risperidone Other (See Comments)     galactorrhea    Benzoyl Peroxide Rash         Medications - reviewed:   Current Outpatient Medications   Medication Sig    ibuprofen (ADVIL;MOTRIN) 600 MG tablet Take 1 tablet by mouth 3 times daily as needed for Pain    clonazePAM (KLONOPIN) 0.5 MG tablet Take 1 tablet by mouth daily as needed. fluvoxaMINE (LUVOX) 100 MG tablet Take 3 tablets by mouth every evening    lamoTRIgine (LAMICTAL) 200 MG tablet Take 1 tablet by mouth daily    norethindrone-ethinyl estradiol (ORTHO-NOVUM 7/7/7) 0.5/0.75/1-35 MG-MCG per tablet Take by mouth daily     No current facility-administered medications for this visit.          Past Medical History - reviewed:  Past Medical History:   Diagnosis Date    Advanced care planning/counseling discussion     Allergic rhinitis     Autism     dr Lillian Godinez    Galactorrhea 12/2009    dr Sean Coburn- due to

## 2023-05-16 NOTE — PROGRESS NOTES
Chief Complaint   Patient presents with    Ear Fullness    Migraine     Prescription migraine medication       BP (!) 138/98 (Site: Right Upper Arm, Position: Sitting, Cuff Size: Large Adult)   Pulse 79   Temp 97.9 °F (36.6 °C) (Temporal)   Resp 16   Ht 5' 4\" (1.626 m)   Wt 181 lb (82.1 kg)   LMP 01/17/2023 (Within Days)   SpO2 99%   BMI 31.07 kg/m²     Health Maintenance Due   Topic Date Due    Varicella vaccine (1 of 2 - 2-dose childhood series) Never done    HIV screen  Never done    Cervical cancer screen  Never done    COVID-19 Vaccine (4 - Booster for Garcia Peter series) 01/19/2022       1. \"Have you been to the ER, urgent care clinic since your last visit? Hospitalized since your last visit? \" No    2. \"Have you seen or consulted any other health care providers outside of the [address] [place] [address] since your last visit? \" No     3. For patients aged 39-70: Has the patient had a colonoscopy / FIT/ Cologuard? NA - based on age      If the patient is female:    4. For patients aged 41-77: Has the patient had a mammogram within the past 2 years? NA - based on age or sex      11. For patients aged 21-65: Has the patient had a pap smear?  Yes - no Care Gap present

## 2023-06-05 ENCOUNTER — HOSPITAL ENCOUNTER (OUTPATIENT)
Facility: HOSPITAL | Age: 38
Discharge: HOME | End: 2023-06-08
Payer: MEDICARE

## 2023-06-05 DIAGNOSIS — R51.9 UNILATERAL HEADACHE: ICD-10-CM

## 2023-06-05 PROCEDURE — 6360000004 HC RX CONTRAST MEDICATION: Performed by: RADIOLOGY

## 2023-06-05 PROCEDURE — 70470 CT HEAD/BRAIN W/O & W/DYE: CPT

## 2023-06-05 RX ADMIN — IOPAMIDOL 100 ML: 612 INJECTION, SOLUTION INTRAVENOUS at 11:01

## 2023-07-03 RX ORDER — LAMOTRIGINE 200 MG/1
200 TABLET ORAL DAILY
Qty: 90 TABLET | Refills: 1 | Status: SHIPPED | OUTPATIENT
Start: 2023-07-03

## 2023-07-03 RX ORDER — FLUVOXAMINE MALEATE 100 MG
300 TABLET ORAL EVERY EVENING
Qty: 270 TABLET | Refills: 1 | Status: SHIPPED | OUTPATIENT
Start: 2023-07-03

## 2023-07-03 NOTE — TELEPHONE ENCOUNTER
I am not able to send the refill for Luvox. I added a #30 of Lamictal. The current script will get her through Nov 2023. Her next visit is 12/04/23.

## 2023-09-11 DIAGNOSIS — R51.9 UNILATERAL HEADACHE: ICD-10-CM

## 2023-09-11 RX ORDER — IBUPROFEN 600 MG/1
600 TABLET ORAL 3 TIMES DAILY PRN
Qty: 30 TABLET | Refills: 0 | OUTPATIENT
Start: 2023-09-11

## 2023-09-11 NOTE — TELEPHONE ENCOUNTER
Last appointment: 5/16/23 MD Radha Monk  Next appointment: None  Previous refill encounter(s): 5/16/23 30    Requested Prescriptions     Pending Prescriptions Disp Refills    ibuprofen (ADVIL;MOTRIN) 600 MG tablet 30 tablet 0     Sig: Take 1 tablet by mouth 3 times daily as needed for Pain     For Pharmacy Admin Tracking Only    Program: Medication Refill  CPA in place:    Recommendation Provided To:    Intervention Detail: New Rx: 1, reason: Patient Preference  Intervention Accepted By:   Oj Barragan Closed?:    Time Spent (min): 5,

## 2023-12-04 ENCOUNTER — TELEPHONE (OUTPATIENT)
Age: 38
End: 2023-12-04

## 2023-12-14 ENCOUNTER — OFFICE VISIT (OUTPATIENT)
Age: 38
End: 2023-12-14

## 2023-12-14 VITALS
DIASTOLIC BLOOD PRESSURE: 87 MMHG | SYSTOLIC BLOOD PRESSURE: 127 MMHG | HEART RATE: 99 BPM | TEMPERATURE: 97.9 F | OXYGEN SATURATION: 97 % | RESPIRATION RATE: 18 BRPM | WEIGHT: 180 LBS | BODY MASS INDEX: 31.89 KG/M2

## 2023-12-14 DIAGNOSIS — J02.9 SORE THROAT: ICD-10-CM

## 2023-12-14 DIAGNOSIS — K12.2 UVULITIS: Primary | ICD-10-CM

## 2023-12-14 LAB
STREP PYOGENES DNA, POC: NEGATIVE
VALID INTERNAL CONTROL, POC: NORMAL

## 2023-12-14 RX ORDER — PREDNISONE 20 MG/1
40 TABLET ORAL DAILY
Qty: 10 TABLET | Refills: 0 | Status: SHIPPED | OUTPATIENT
Start: 2023-12-14 | End: 2023-12-19

## 2023-12-14 RX ORDER — AZITHROMYCIN 250 MG/1
250 TABLET, FILM COATED ORAL SEE ADMIN INSTRUCTIONS
Qty: 6 TABLET | Refills: 0 | Status: SHIPPED | OUTPATIENT
Start: 2023-12-14 | End: 2023-12-19

## 2023-12-14 NOTE — PROGRESS NOTES
Right Ear: Tympanic membrane, ear canal and external ear normal. There is no impacted cerumen. Left Ear: Tympanic membrane, ear canal and external ear normal. There is no impacted cerumen. Nose: Nose normal. No congestion or rhinorrhea. Mouth/Throat:      Mouth: Mucous membranes are moist.      Pharynx: Uvula midline. Posterior oropharyngeal erythema and uvula swelling present. No pharyngeal swelling or oropharyngeal exudate. Tonsils: No tonsillar exudate or tonsillar abscesses. Cardiovascular:      Rate and Rhythm: Normal rate and regular rhythm. Pulses: Normal pulses. Heart sounds: Normal heart sounds. No murmur heard. No friction rub. No gallop. Pulmonary:      Effort: Pulmonary effort is normal. No respiratory distress. Breath sounds: Normal breath sounds. No wheezing, rhonchi or rales. Musculoskeletal:      Cervical back: Normal range of motion and neck supple. No tenderness. Lymphadenopathy:      Cervical: Cervical adenopathy present. Skin:     General: Skin is warm and dry. Neurological:      General: No focal deficit present. Mental Status: She is alert and oriented to person, place, and time. An electronic signature was used to authenticate this note.     SIMI Nathan - ALBERTO

## 2023-12-14 NOTE — PATIENT INSTRUCTIONS
Symptoms consistent with uvulitis  Will treat with Azithromycin   Prednisone as directed  Lots of fluids, plenty of rest  Return if symptoms persist or worsen  Go to ED if you develop any difficulty breathing or swallowing or if swelling seems to be getting worse

## 2024-01-02 ENCOUNTER — OFFICE VISIT (OUTPATIENT)
Age: 39
End: 2024-01-02
Payer: MEDICARE

## 2024-01-02 ENCOUNTER — TELEPHONE (OUTPATIENT)
Age: 39
End: 2024-01-02

## 2024-01-02 VITALS
SYSTOLIC BLOOD PRESSURE: 136 MMHG | DIASTOLIC BLOOD PRESSURE: 86 MMHG | OXYGEN SATURATION: 98 % | HEART RATE: 111 BPM | BODY MASS INDEX: 32.28 KG/M2 | TEMPERATURE: 97 F | WEIGHT: 182.2 LBS | HEIGHT: 63 IN | RESPIRATION RATE: 18 BRPM

## 2024-01-02 DIAGNOSIS — J02.9 ACUTE PHARYNGITIS, UNSPECIFIED ETIOLOGY: Primary | ICD-10-CM

## 2024-01-02 DIAGNOSIS — J02.9 SORE THROAT: ICD-10-CM

## 2024-01-02 LAB
GROUP A STREP ANTIGEN, POC: NEGATIVE
LOT EXPIRE DATE: NORMAL
LOT KIT NUMBER: NORMAL
SARS-COV-2, POC: NORMAL
VALID INTERNAL CONTROL, POC: YES
VALID INTERNAL CONTROL: YES
VENDOR AND KIT NAME POC: NORMAL

## 2024-01-02 PROCEDURE — 99214 OFFICE O/P EST MOD 30 MIN: CPT | Performed by: STUDENT IN AN ORGANIZED HEALTH CARE EDUCATION/TRAINING PROGRAM

## 2024-01-02 PROCEDURE — 87880 STREP A ASSAY W/OPTIC: CPT | Performed by: STUDENT IN AN ORGANIZED HEALTH CARE EDUCATION/TRAINING PROGRAM

## 2024-01-02 PROCEDURE — PBSHW AMB POC SARS-COV-2: Performed by: STUDENT IN AN ORGANIZED HEALTH CARE EDUCATION/TRAINING PROGRAM

## 2024-01-02 PROCEDURE — G8484 FLU IMMUNIZE NO ADMIN: HCPCS | Performed by: STUDENT IN AN ORGANIZED HEALTH CARE EDUCATION/TRAINING PROGRAM

## 2024-01-02 PROCEDURE — G8427 DOCREV CUR MEDS BY ELIG CLIN: HCPCS | Performed by: STUDENT IN AN ORGANIZED HEALTH CARE EDUCATION/TRAINING PROGRAM

## 2024-01-02 PROCEDURE — G8417 CALC BMI ABV UP PARAM F/U: HCPCS | Performed by: STUDENT IN AN ORGANIZED HEALTH CARE EDUCATION/TRAINING PROGRAM

## 2024-01-02 PROCEDURE — PBSHW AMB POC RAPID STREP A: Performed by: STUDENT IN AN ORGANIZED HEALTH CARE EDUCATION/TRAINING PROGRAM

## 2024-01-02 PROCEDURE — 87426 SARSCOV CORONAVIRUS AG IA: CPT | Performed by: STUDENT IN AN ORGANIZED HEALTH CARE EDUCATION/TRAINING PROGRAM

## 2024-01-02 PROCEDURE — 1036F TOBACCO NON-USER: CPT | Performed by: STUDENT IN AN ORGANIZED HEALTH CARE EDUCATION/TRAINING PROGRAM

## 2024-01-02 RX ORDER — NORETHINDRONE AND ETHINYL ESTRADIOL 1 MG-35MCG
KIT ORAL
COMMUNITY
Start: 2023-11-13

## 2024-01-02 RX ORDER — PREDNISONE 20 MG/1
20 TABLET ORAL DAILY
Qty: 5 TABLET | Refills: 0 | Status: SHIPPED | OUTPATIENT
Start: 2024-01-02 | End: 2024-01-07

## 2024-01-02 ASSESSMENT — PATIENT HEALTH QUESTIONNAIRE - PHQ9
SUM OF ALL RESPONSES TO PHQ QUESTIONS 1-9: 0
SUM OF ALL RESPONSES TO PHQ9 QUESTIONS 1 & 2: 0
SUM OF ALL RESPONSES TO PHQ QUESTIONS 1-9: 0
1. LITTLE INTEREST OR PLEASURE IN DOING THINGS: 0
SUM OF ALL RESPONSES TO PHQ QUESTIONS 1-9: 0
2. FEELING DOWN, DEPRESSED OR HOPELESS: 0
SUM OF ALL RESPONSES TO PHQ QUESTIONS 1-9: 0

## 2024-01-02 NOTE — TELEPHONE ENCOUNTER
Patient mom called stating her frustration with another practice that manages the patient behavioral health. She stated that its hard to get a refill on medication because the practice has a change of staff often and tells her to establish new care for her daughter with another provider which causes delay in patient medication. Patient mother would like to know if  could take over and manage her behavioral health as well.  She soon needs a refill on Fluvoxamine maleate for patient.   Patient is wanting a call back and provided home and cell number:  Cell: [phone]  Home: [phone]

## 2024-01-02 NOTE — TELEPHONE ENCOUNTER
Patient states she needs following refill has a new to provider appt on 1/24/24                            lamoTRIgine (LAMICTAL) 200 MG tablet

## 2024-01-02 NOTE — PROGRESS NOTES
Jaclyn Matos  38 y.o. female  1985  [address] [facility] [address]  [id number]     [facility]       Chief Complaint:  Chief Complaint   Patient presents with    Pharyngitis     Spots on Palliate last night    Source: self, the medical record     Subjective  Jaclyn Matos is an 38 y.o. female who presents for sore throat and mom noticed red spots on her palliate last night but they were gone this morning. Unsure if throat pain is worse over the last week or so. Is eating and drinking ok but without much of an appetite. She has been sucking on throat lozenges for the sore throat. No fever, chills, N/V.    She works in  in a healthcare facility. Went to  12/14 for similar symptoms - treated with prednisone and Z pack.      ROS  Negative except what is mentioned in HPI    Allergies - reviewed:   Allergies   Allergen Reactions    Amoxicillin Hives    Risperidone Other (See Comments)     galactorrhea    Benzoyl Peroxide Rash         Medications - reviewed:   Current Outpatient Medications   Medication Sig    Multiple Vitamins-Minerals (MULTIVITAMIN ADULTS PO) Take 1 tablet by mouth daily    NORTREL 1/35, 28, 1-35 MG-MCG per tablet     predniSONE (DELTASONE) 20 MG tablet Take 1 tablet by mouth daily for 5 days    fluvoxaMINE (LUVOX) 100 MG tablet Take 3 tablets by mouth every evening    lamoTRIgine (LAMICTAL) 200 MG tablet Take 1 tablet by mouth daily    ibuprofen (ADVIL;MOTRIN) 600 MG tablet Take 1 tablet by mouth 3 times daily as needed for Pain    clonazePAM (KLONOPIN) 0.5 MG tablet Take 1 tablet by mouth daily as needed.    norethindrone-ethinyl estradiol (ORTHO-NOVUM 7/7/7) 0.5/0.75/1-35 MG-MCG per tablet Take by mouth daily (Patient not taking: Reported on 1/2/2024)     No current facility-administered medications for this visit.         Past Medical History - reviewed:  Past Medical History:   Diagnosis Date    Advanced care planning/counseling discussion

## 2024-01-02 NOTE — PROGRESS NOTES
Chief Complaint   Patient presents with    Pharyngitis     Spots on Palliate last night      \"Have you been to the ER, urgent care clinic since your last visit?  Hospitalized since your last visit?\"    [facility] : Sore Throat    “Have you seen or consulted any other health care providers outside of [facility] since your last visit?”    NO        “Have you had a pap smear?”    NO                1/2/2024    10:16 AM   PHQ-9    Little interest or pleasure in doing things 0   Feeling down, depressed, or hopeless 0   PHQ-2 Score 0   PHQ-9 Total Score 0           Financial Resource Strain: Low Risk  (5/16/2023)    Overall Financial Resource Strain (CARDIA)     Difficulty of Paying Living Expenses: Not hard at all      Food Insecurity: Not on file (5/16/2023)          Health Maintenance Due   Topic Date Due    Varicella vaccine (1 of 2 - 2-dose childhood series) Never done    HIV screen  Never done    Cervical cancer screen  Never done    Flu vaccine (1) 08/01/2023    COVID-19 Vaccine (4 - 2023-24 season) 09/01/2023    Annual Wellness Visit (Medicare Advantage)  Never done

## 2024-01-03 ENCOUNTER — OFFICE VISIT (OUTPATIENT)
Age: 39
End: 2024-01-03

## 2024-01-03 ENCOUNTER — TELEPHONE (OUTPATIENT)
Age: 39
End: 2024-01-03

## 2024-01-03 VITALS
OXYGEN SATURATION: 99 % | SYSTOLIC BLOOD PRESSURE: 140 MMHG | TEMPERATURE: 98.6 F | DIASTOLIC BLOOD PRESSURE: 109 MMHG | RESPIRATION RATE: 18 BRPM | HEART RATE: 104 BPM | BODY MASS INDEX: 32.95 KG/M2 | WEIGHT: 186 LBS

## 2024-01-03 DIAGNOSIS — Z76.0 MEDICATION REFILL: Primary | ICD-10-CM

## 2024-01-03 RX ORDER — FLUVOXAMINE MALEATE 100 MG
300 TABLET ORAL EVERY EVENING
Qty: 90 TABLET | Refills: 0 | Status: SHIPPED | OUTPATIENT
Start: 2024-01-03 | End: 2024-02-02

## 2024-01-03 NOTE — TELEPHONE ENCOUNTER
Darlene pt's mother called said the pt cannot get in to see her new psychiatrist until 1/24/24. She wants to know if the provider can send 90 day supply. Caller requested callback.       fluvoxaMINE (LUVOX) 100 MG tablet

## 2024-01-04 NOTE — PROGRESS NOTES
Chief Complaint   Patient presents with    Medication Refill     Patient presents for medication refill until she can see her new provider          Medication Refill      Here for refill of depression medication   In between providers  No appointment until 1 month pascual seen      Past Medical History:   Diagnosis Date    Advanced care planning/counseling discussion     Allergic rhinitis     Autism     dr baumann atri    Galactorrhea 12/2009    dr rivas- due to risperidone    Headache(784.0)     History of chicken pox     Hypertension     OCD (obsessive compulsive disorder)     Strabismus     Superficial laceration 06/09/2017    on scalp PF [place]-no sutures but got Tdap booster       Past Surgical History:   Procedure Laterality Date    HEENT  2007    gum    HEENT      eye    HEENT      tonsils and wisdom teeth        Social History     Tobacco Use    Smoking status: Never     Passive exposure: Never    Smokeless tobacco: Never   Vaping Use    Vaping Use: Never used   Substance Use Topics    Alcohol use: No    Drug use: No        Allergies   Allergen Reactions    Amoxicillin Hives    Risperidone Other (See Comments)     galactorrhea    Benzoyl Peroxide Rash        Review of Systems   Psychiatric/Behavioral: Negative.          BP (!) 140/109 (Site: Right Upper Arm, Position: Sitting, Cuff Size: Medium Adult)   Pulse (!) 104   Temp 98.6 °F (37 °C) (Oral)   Resp 18   Wt 84.4 kg (186 lb)   SpO2 99%   BMI 32.95 kg/m²      Physical Exam  Vitals and nursing note reviewed.   Constitutional:       General: She is not in acute distress.     Appearance: Normal appearance.   Neurological:      Mental Status: She is alert.   Psychiatric:         Mood and Affect: Mood normal.         Behavior: Behavior normal.          1. Medication refill     Rtx refilled x 1 month   No results found for any visits on 01/03/24.  The patients condition was discussed with the patient and they understand.  The patient is to follow up

## 2024-01-05 ENCOUNTER — TELEPHONE (OUTPATIENT)
Age: 39
End: 2024-01-05

## 2024-01-05 NOTE — TELEPHONE ENCOUNTER
Attempted to return call to pt's mother, left voicemail Dr. Lopez was unable to prescribe medication so pt will need to continue seeing psych.

## 2024-01-05 NOTE — TELEPHONE ENCOUNTER
Patient mother called saying she missed a call from the office, but I don't see anything so I took down a callback number.   Her best call back number is: [phone]

## 2024-01-24 ENCOUNTER — OFFICE VISIT (OUTPATIENT)
Age: 39
End: 2024-01-24
Payer: MEDICARE

## 2024-01-24 VITALS
WEIGHT: 186 LBS | DIASTOLIC BLOOD PRESSURE: 85 MMHG | BODY MASS INDEX: 32.96 KG/M2 | SYSTOLIC BLOOD PRESSURE: 125 MMHG | RESPIRATION RATE: 16 BRPM | TEMPERATURE: 98.2 F | HEIGHT: 63 IN | OXYGEN SATURATION: 99 % | HEART RATE: 84 BPM

## 2024-01-24 DIAGNOSIS — F41.1 GENERALIZED ANXIETY DISORDER: Primary | ICD-10-CM

## 2024-01-24 DIAGNOSIS — F42.9 OBSESSIVE-COMPULSIVE DISORDER, UNSPECIFIED TYPE: ICD-10-CM

## 2024-01-24 PROCEDURE — 1036F TOBACCO NON-USER: CPT | Performed by: NURSE PRACTITIONER

## 2024-01-24 PROCEDURE — 90792 PSYCH DIAG EVAL W/MED SRVCS: CPT | Performed by: NURSE PRACTITIONER

## 2024-01-24 RX ORDER — CLONAZEPAM 0.5 MG/1
0.5 TABLET ORAL DAILY PRN
Qty: 30 TABLET | Refills: 2 | Status: SHIPPED | OUTPATIENT
Start: 2024-01-24 | End: 2024-04-23

## 2024-01-24 RX ORDER — FLUVOXAMINE MALEATE 100 MG
300 TABLET ORAL EVERY EVENING
Qty: 90 TABLET | Refills: 0 | Status: SHIPPED | OUTPATIENT
Start: 2024-01-24 | End: 2024-02-23

## 2024-01-24 RX ORDER — LAMOTRIGINE 200 MG/1
200 TABLET ORAL DAILY
Qty: 90 TABLET | Refills: 0 | Status: SHIPPED | OUTPATIENT
Start: 2024-01-24

## 2024-01-24 ASSESSMENT — PATIENT HEALTH QUESTIONNAIRE - PHQ9
8. MOVING OR SPEAKING SO SLOWLY THAT OTHER PEOPLE COULD HAVE NOTICED. OR THE OPPOSITE, BEING SO FIGETY OR RESTLESS THAT YOU HAVE BEEN MOVING AROUND A LOT MORE THAN USUAL: 0
SUM OF ALL RESPONSES TO PHQ QUESTIONS 1-9: 2
6. FEELING BAD ABOUT YOURSELF - OR THAT YOU ARE A FAILURE OR HAVE LET YOURSELF OR YOUR FAMILY DOWN: 1
1. LITTLE INTEREST OR PLEASURE IN DOING THINGS: 0
4. FEELING TIRED OR HAVING LITTLE ENERGY: 0
7. TROUBLE CONCENTRATING ON THINGS, SUCH AS READING THE NEWSPAPER OR WATCHING TELEVISION: 0
2. FEELING DOWN, DEPRESSED OR HOPELESS: 0
SUM OF ALL RESPONSES TO PHQ QUESTIONS 1-9: 2
SUM OF ALL RESPONSES TO PHQ QUESTIONS 1-9: 2
10. IF YOU CHECKED OFF ANY PROBLEMS, HOW DIFFICULT HAVE THESE PROBLEMS MADE IT FOR YOU TO DO YOUR WORK, TAKE CARE OF THINGS AT HOME, OR GET ALONG WITH OTHER PEOPLE: 0
5. POOR APPETITE OR OVEREATING: 1
9. THOUGHTS THAT YOU WOULD BE BETTER OFF DEAD, OR OF HURTING YOURSELF: 0
3. TROUBLE FALLING OR STAYING ASLEEP: 0
SUM OF ALL RESPONSES TO PHQ QUESTIONS 1-9: 2

## 2024-01-24 ASSESSMENT — ANXIETY QUESTIONNAIRES
6. BECOMING EASILY ANNOYED OR IRRITABLE: 1
1. FEELING NERVOUS, ANXIOUS, OR ON EDGE: 1
IF YOU CHECKED OFF ANY PROBLEMS ON THIS QUESTIONNAIRE, HOW DIFFICULT HAVE THESE PROBLEMS MADE IT FOR YOU TO DO YOUR WORK, TAKE CARE OF THINGS AT HOME, OR GET ALONG WITH OTHER PEOPLE: SOMEWHAT DIFFICULT
GAD7 TOTAL SCORE: 4
2. NOT BEING ABLE TO STOP OR CONTROL WORRYING: 1
7. FEELING AFRAID AS IF SOMETHING AWFUL MIGHT HAPPEN: 0
4. TROUBLE RELAXING: 0
3. WORRYING TOO MUCH ABOUT DIFFERENT THINGS: 1
5. BEING SO RESTLESS THAT IT IS HARD TO SIT STILL: 0

## 2024-01-24 NOTE — PROGRESS NOTES
Chief Complaint   Patient presents with    New Patient     Former Elenita Pt     Vitals:    01/24/24 1054   BP: 125/85   Pulse: 84   Resp: 16   Temp: 98.2 °F (36.8 °C)   SpO2: 99%     Prior to Admission medications    Medication Sig Start Date End Date Taking? Authorizing Provider   fluvoxaMINE (LUVOX) 100 MG tablet Take 3 tablets by mouth every evening 1/3/24 2/2/24 Yes Cristhian Serra MD   Multiple Vitamins-Minerals (MULTIVITAMIN ADULTS PO) Take 1 tablet by mouth daily   Yes Provider, MD Scout   NORTREL 1/35, 28, 1-35 MG-MCG per tablet  11/13/23  Yes Provider, MD Scout   lamoTRIgine (LAMICTAL) 200 MG tablet Take 1 tablet by mouth daily 7/3/23  Yes Elenita Raymundo, APRN - NP   ibuprofen (ADVIL;MOTRIN) 600 MG tablet Take 1 tablet by mouth 3 times daily as needed for Pain 5/16/23  Yes Ro Lopez MD   clonazePAM (KLONOPIN) 0.5 MG tablet Take 1 tablet by mouth daily as needed. 12/14/22  Yes Automatic Reconciliation, Ar   norethindrone-ethinyl estradiol (ORTHO-NOVUM 7/7/7) 0.5/0.75/1-35 MG-MCG per tablet Take by mouth daily 1/12/10  Yes Automatic Reconciliation, Ar     PHQ-9 Total Score: 2 (1/24/2024 10:51 AM)  Thoughts that you would be better off dead, or of hurting yourself in some way: 0 (1/24/2024 10:51 AM)        1/24/2024    10:52 AM 1/18/2023     2:00 PM   TC-7 SCREENING   Feeling nervous, anxious, or on edge Several days Several days   Not being able to stop or control worrying Several days Not at all   Worrying too much about different things Several days Not at all   Trouble relaxing Not at all Not at all   Being so restless that it is hard to sit still Not at all Not at all   Becoming easily annoyed or irritable Several days Not at all   Feeling afraid as if something awful might happen Not at all Not at all   TC-7 Total Score 4 1   How difficult have these problems made it for you to do your work, take care of things at home, or get along with other people? Somewhat difficult Somewhat 
side effects, questions or issues.  3. PSYCHOTHERAPY: Patient was provided with supportive therapy, strongly encourage to seek psychotherapy.  Patient refused psychotherapy at this time.  4. MEDICAL CARE: Patient was strongly encourage to take their medical medications and follow up with their PCP on regular basis.    5. SUBSTANCE ABUSE CARE: Patient denies a smoking, drinking, abusing any illicit drugs.  6. Review previous labs and medical tests in the EHR. I will continue to order blood tests/labs and diagnostic tests as deemed appropriate and review results as they become available (see orders for details).  7. Review old psychiatric and medical records available in the EHR. I will order additional psychiatric records from other institutions/providers as appropriate.  8. Gather additional collateral information as appropriate.  9.Patient was informed that in case of emergency to go to the nearest ER or call 911    Patient was given time to ask questions and voice concerns. I believe all questions concerns were adequately addressed at this office visit. Patient verbalized agreement and understanding of the above-stated plan.        Follow-up and Dispositions    Return in about 3 months (around 4/24/2024).        1/24/2024   SIMI Lemon - NP

## 2024-03-05 DIAGNOSIS — F42.9 OBSESSIVE-COMPULSIVE DISORDER, UNSPECIFIED TYPE: ICD-10-CM

## 2024-03-06 RX ORDER — FLUVOXAMINE MALEATE 100 MG
TABLET ORAL
Qty: 90 TABLET | Refills: 0 | Status: SHIPPED | OUTPATIENT
Start: 2024-03-06

## 2024-04-04 DIAGNOSIS — F42.9 OBSESSIVE-COMPULSIVE DISORDER, UNSPECIFIED TYPE: ICD-10-CM

## 2024-04-08 ENCOUNTER — TELEPHONE (OUTPATIENT)
Age: 39
End: 2024-04-08

## 2024-04-08 NOTE — TELEPHONE ENCOUNTER
Patient mom is requesting a refill on following meds states patient needs refill today urgent             fluvoxaMINE (LUVOX) 100 MG tablet [Pharmac*90 tab*0        Sig: TAKE THREE TABLETS BY MOUTH EVERY EVENING     Preferred pharmacy:[facility] [id number] - [place], [place] - [address] [facility] [address] - P [phone] - F [phone]

## 2024-04-09 RX ORDER — FLUVOXAMINE MALEATE 100 MG
TABLET ORAL
Qty: 90 TABLET | Refills: 0 | Status: SHIPPED | OUTPATIENT
Start: 2024-04-09

## 2024-04-23 ENCOUNTER — OFFICE VISIT (OUTPATIENT)
Age: 39
End: 2024-04-23
Payer: MEDICARE

## 2024-04-23 VITALS
HEART RATE: 93 BPM | BODY MASS INDEX: 31.86 KG/M2 | HEIGHT: 63 IN | OXYGEN SATURATION: 97 % | DIASTOLIC BLOOD PRESSURE: 99 MMHG | RESPIRATION RATE: 18 BRPM | SYSTOLIC BLOOD PRESSURE: 150 MMHG | WEIGHT: 179.8 LBS | TEMPERATURE: 98 F

## 2024-04-23 DIAGNOSIS — Z00.00 MEDICARE ANNUAL WELLNESS VISIT, SUBSEQUENT: Primary | ICD-10-CM

## 2024-04-23 DIAGNOSIS — E66.09 CLASS 1 OBESITY DUE TO EXCESS CALORIES WITH SERIOUS COMORBIDITY AND BODY MASS INDEX (BMI) OF 31.0 TO 31.9 IN ADULT: ICD-10-CM

## 2024-04-23 DIAGNOSIS — I10 PRIMARY HYPERTENSION: ICD-10-CM

## 2024-04-23 PROCEDURE — 99214 OFFICE O/P EST MOD 30 MIN: CPT | Performed by: STUDENT IN AN ORGANIZED HEALTH CARE EDUCATION/TRAINING PROGRAM

## 2024-04-23 RX ORDER — AMLODIPINE BESYLATE 5 MG/1
5 TABLET ORAL DAILY
Qty: 90 TABLET | Refills: 0 | Status: SHIPPED | OUTPATIENT
Start: 2024-04-23

## 2024-04-23 ASSESSMENT — LIFESTYLE VARIABLES
HOW MANY STANDARD DRINKS CONTAINING ALCOHOL DO YOU HAVE ON A TYPICAL DAY: PATIENT DOES NOT DRINK
HOW OFTEN DO YOU HAVE A DRINK CONTAINING ALCOHOL: NEVER

## 2024-04-23 ASSESSMENT — PATIENT HEALTH QUESTIONNAIRE - PHQ9
SUM OF ALL RESPONSES TO PHQ9 QUESTIONS 1 & 2: 0
SUM OF ALL RESPONSES TO PHQ QUESTIONS 1-9: 0
1. LITTLE INTEREST OR PLEASURE IN DOING THINGS: NOT AT ALL
SUM OF ALL RESPONSES TO PHQ QUESTIONS 1-9: 0
SUM OF ALL RESPONSES TO PHQ QUESTIONS 1-9: 0
2. FEELING DOWN, DEPRESSED OR HOPELESS: NOT AT ALL
SUM OF ALL RESPONSES TO PHQ QUESTIONS 1-9: 0

## 2024-04-23 NOTE — PATIENT INSTRUCTIONS
A few options for looking into a therapist for working with you for your anxiety and autism:    [facility]  [address]  [phone]     [facility] [phone]  [email or web address]    [facility]  [address]  [place] [address]    [facility]  [address], [address]  [place] [address]    [facility]  [address] [place] [address]    [facility]:  [phone]   [email or web address]    [facility]  [address]  [facility]  [address]  [place], [place]    [facility] (7 locations)  [email or web address]    [address] [place] [address] [phone]     [address], [address]  [place] [address] [phone]         Learning About Vision Tests  What are vision tests?     The four most common vision tests are visual acuity tests, refraction, visual field tests, and color vision tests.  Visual acuity (sharpness) tests  These tests are used:  To see if you need glasses or contact lenses.  To monitor an eye problem.  To check an eye injury.  Visual acuity tests are done as part of routine exams. You may also have this test when you get your 's license or apply for some types of jobs.  Visual field tests  These tests are used:  To check for vision loss in any area of your range of vision.  To screen for certain eye diseases.  To look for nerve damage after a stroke, head injury, or other problem that could reduce blood flow to the brain.  Refraction and color tests  A refraction test is done to find the right prescription for glasses and contact lenses.  A color vision test is done to check for color blindness.  Color vision is often tested as part of a routine exam. You may also have this test when you apply for a job where recognizing different colors is important, such as , electronics, or the .  How

## 2024-04-23 NOTE — PROGRESS NOTES
Chief Complaint   Patient presents with    Medicare AWV    Weight Management     1. \"Have you been to the ER, urgent care clinic since your last visit?  Hospitalized since your last visit?\" Yes 1/3/24 [facility] for Medication Refill.    2. \"Have you seen or consulted any other health care providers outside of the [facility] since your last visit?\" No     3. For patients aged 45-75: Has the patient had a colonoscopy / FIT/ Cologuard? N/A      If the patient is female:    4. For patients aged 40-74: Has the patient had a mammogram within the past 2 years? N/A      5. For patients aged 21-65: Has the patient had a pap smear? Yes        The patient, Jaclyn Hu, identity was verified by name and .    Health Maintenance Due   Topic Date Due    Varicella vaccine (1 of 2 - 2-dose childhood series) Never done    HIV screen  Never done    Cervical cancer screen  Never done    COVID-19 Vaccine (4 - 2023-24 season) 2023    Annual Wellness Visit (Medicare Advantage)  Never done

## 2024-04-23 NOTE — PROGRESS NOTES
Medicare Annual Wellness Visit    Jaclyn Matos is here for Medicare AWV and Weight Management    Assessment & Plan   Medicare annual wellness visit, subsequent  Updated medical, surgical, family and social history and completed or discussed all age, gender and comorbidity-related screenings and placed appropriate orders c/w this discussion.     Class 1 obesity due to excess calories with serious comorbidity and body mass index (BMI) of 31.0 to 31.9 in adult: counseled on diet today, binge eating and overeating likely related to ASD/OCD, advised establishing with therapist to address some of these behaviors in conjunction with nutritionist   -     Mariel Paz RD, Nutrition ServicesNasim ([facility])  Primary hypertension: Bps elevated over time and have persistently elevated in office so starting medication today. Did discuss importance of diet and watching processed food intake   -     amLODIPine (NORVASC) 5 MG tablet; Take 1 tablet by mouth daily, Disp-90 tablet, R-0Normal  -     Mariel Paz RD, Nutrition Jessika Rouse ([facility])     Recommendations for Preventive Services Due: see orders and patient instructions/AVS.  Recommended screening schedule for the next 5-10 years is provided to the patient in written form: see Patient Instructions/AVS.     Return in about 1 month (around 5/23/2024) for blood pressure.     Subjective   The following acute and/or chronic problems were also addressed today:    Elevated blood pressure: had BP cuff at home but hasn't been checking regularly. Having HA frequently - relieved by ibuprofen    Patient's complete Health Risk Assessment and screening values have been reviewed and are found in Flowsheets. The following problems were reviewed today and where indicated follow up appointments were made and/or referrals ordered.    Positive Risk Factor Screenings with Interventions:               General HRA Questions:  Select all that apply: (!)

## 2024-04-24 ENCOUNTER — TELEMEDICINE (OUTPATIENT)
Age: 39
End: 2024-04-24
Payer: MEDICARE

## 2024-04-24 DIAGNOSIS — F42.9 OBSESSIVE-COMPULSIVE DISORDER, UNSPECIFIED TYPE: ICD-10-CM

## 2024-04-24 DIAGNOSIS — F41.1 GENERALIZED ANXIETY DISORDER: ICD-10-CM

## 2024-04-24 PROCEDURE — G8427 DOCREV CUR MEDS BY ELIG CLIN: HCPCS | Performed by: NURSE PRACTITIONER

## 2024-04-24 PROCEDURE — 1036F TOBACCO NON-USER: CPT | Performed by: NURSE PRACTITIONER

## 2024-04-24 PROCEDURE — G8417 CALC BMI ABV UP PARAM F/U: HCPCS | Performed by: NURSE PRACTITIONER

## 2024-04-24 PROCEDURE — 99214 OFFICE O/P EST MOD 30 MIN: CPT | Performed by: NURSE PRACTITIONER

## 2024-04-24 RX ORDER — FLUVOXAMINE MALEATE 100 MG
TABLET ORAL
Qty: 90 TABLET | Refills: 2 | Status: SHIPPED | OUTPATIENT
Start: 2024-04-24

## 2024-04-24 RX ORDER — LAMOTRIGINE 200 MG/1
200 TABLET ORAL DAILY
Qty: 90 TABLET | Refills: 0 | Status: SHIPPED | OUTPATIENT
Start: 2024-04-24

## 2024-04-24 NOTE — PROGRESS NOTES
Jaclyn Matos, was evaluated through a synchronous (real-time) audio-video encounter. The patient (or guardian if applicable) is aware that this is a billable service, which includes applicable co-pays. This Virtual Visit was conducted with patient's (and/or legal guardian's) consent. Patient identification was verified, and a caregiver was present when appropriate.     The patient was located at Home: [address] [place] [id number]  Provider was located at Facility (Appt Dept): [address] [place] [address]  [address]  [place] [address]    Confirm you are appropriately licensed, registered, or certified to deliver care in the state where the patient is located as indicated above. If you are not or unsure, please re-schedule the visit: Yes, I confirm.     CHIEF COMPLAINT:  Jaclyn Matos is a 39 y.o. female and was seen today for follow-up of psychiatric condition and psychotropic medication management.     HPI:    Jaclyn reports the following psychiatric symptoms by hx: depression, anxiety, and OCD.Overall symptoms have been present for years. Currently symptoms are of low severity. The symptoms occur less frequently and are less severe. Patient denies any new stressor. States that work has been going well. She feels her depression and anxiety are manageable. She rates her depression at 1/10 with 10 being the worst. Anxiety is rated at 2-3/10 with 10 being the worst. Energy level is good. Patient denies any concerns with intrusive thoughts. She reports compliance with medication. Denies side effects. No complaint of rash. Patient and mother feels medications are working well. Patient Appetite:good. Sleep: good, achieving about 8 hours. Patient denies symptoms of psychosis or delphine. Denies suicidal or homicidal ideation.    Current Outpatient Medications on File Prior to Visit   Medication Sig Dispense Refill    amLODIPine (NORVASC) 5 MG tablet Take 1 tablet by mouth daily 90 tablet 0    clonazePAM

## 2024-05-01 ENCOUNTER — TELEPHONE (OUTPATIENT)
Age: 39
End: 2024-05-01

## 2024-05-01 NOTE — TELEPHONE ENCOUNTER
Patient's mother Darlene called,  she states patient's blood pressure is still staying high after starting the new blood pressure medication    Yesterday in the pm the blood pressure was  130/106    She would like to know when should the medication start helping with the blood pressure    Please call  [phone]

## 2024-05-03 NOTE — TELEPHONE ENCOUNTER
Spoke with mother advised per Dr Lopez to continue to monitor patient BP and bring readings to next appointment to discuss medication adjustment.

## 2024-05-07 DIAGNOSIS — F42.9 OBSESSIVE-COMPULSIVE DISORDER, UNSPECIFIED TYPE: ICD-10-CM

## 2024-05-09 RX ORDER — FLUVOXAMINE MALEATE 100 MG
TABLET ORAL
Qty: 90 TABLET | Refills: 2 | OUTPATIENT
Start: 2024-05-09

## 2024-05-24 ENCOUNTER — OFFICE VISIT (OUTPATIENT)
Age: 39
End: 2024-05-24
Payer: MEDICARE

## 2024-05-24 VITALS
SYSTOLIC BLOOD PRESSURE: 136 MMHG | OXYGEN SATURATION: 99 % | BODY MASS INDEX: 30.83 KG/M2 | RESPIRATION RATE: 16 BRPM | DIASTOLIC BLOOD PRESSURE: 82 MMHG | HEIGHT: 63 IN | TEMPERATURE: 97.7 F | HEART RATE: 84 BPM | WEIGHT: 174 LBS

## 2024-05-24 DIAGNOSIS — E66.09 CLASS 1 OBESITY DUE TO EXCESS CALORIES WITH SERIOUS COMORBIDITY AND BODY MASS INDEX (BMI) OF 30.0 TO 30.9 IN ADULT: ICD-10-CM

## 2024-05-24 DIAGNOSIS — I10 PRIMARY HYPERTENSION: Primary | ICD-10-CM

## 2024-05-24 PROCEDURE — G8427 DOCREV CUR MEDS BY ELIG CLIN: HCPCS | Performed by: STUDENT IN AN ORGANIZED HEALTH CARE EDUCATION/TRAINING PROGRAM

## 2024-05-24 PROCEDURE — 99214 OFFICE O/P EST MOD 30 MIN: CPT | Performed by: STUDENT IN AN ORGANIZED HEALTH CARE EDUCATION/TRAINING PROGRAM

## 2024-05-24 PROCEDURE — 3075F SYST BP GE 130 - 139MM HG: CPT | Performed by: STUDENT IN AN ORGANIZED HEALTH CARE EDUCATION/TRAINING PROGRAM

## 2024-05-24 PROCEDURE — G2211 COMPLEX E/M VISIT ADD ON: HCPCS | Performed by: STUDENT IN AN ORGANIZED HEALTH CARE EDUCATION/TRAINING PROGRAM

## 2024-05-24 PROCEDURE — G8417 CALC BMI ABV UP PARAM F/U: HCPCS | Performed by: STUDENT IN AN ORGANIZED HEALTH CARE EDUCATION/TRAINING PROGRAM

## 2024-05-24 PROCEDURE — 1036F TOBACCO NON-USER: CPT | Performed by: STUDENT IN AN ORGANIZED HEALTH CARE EDUCATION/TRAINING PROGRAM

## 2024-05-24 PROCEDURE — 3079F DIAST BP 80-89 MM HG: CPT | Performed by: STUDENT IN AN ORGANIZED HEALTH CARE EDUCATION/TRAINING PROGRAM

## 2024-05-24 SDOH — ECONOMIC STABILITY: INCOME INSECURITY: HOW HARD IS IT FOR YOU TO PAY FOR THE VERY BASICS LIKE FOOD, HOUSING, MEDICAL CARE, AND HEATING?: NOT HARD AT ALL

## 2024-05-24 SDOH — ECONOMIC STABILITY: FOOD INSECURITY: WITHIN THE PAST 12 MONTHS, YOU WORRIED THAT YOUR FOOD WOULD RUN OUT BEFORE YOU GOT MONEY TO BUY MORE.: NEVER TRUE

## 2024-05-24 SDOH — ECONOMIC STABILITY: FOOD INSECURITY: WITHIN THE PAST 12 MONTHS, THE FOOD YOU BOUGHT JUST DIDN'T LAST AND YOU DIDN'T HAVE MONEY TO GET MORE.: NEVER TRUE

## 2024-05-24 NOTE — PROGRESS NOTES
Jaclyn Matos  39 y.o. female  1985  [address] [address]  [id number]     [facility]       Chief Complaint:  Chief Complaint   Patient presents with    Hypertension     Follow-up   Source: self, the medical record     Subjective  History of Present Illness  The patient is here today to follow up for blood pressure. She was seen in the office several weeks ago and started on amlodipine 5 mg daily at that time after having several elevated blood pressure readings during office visits over time.    The patient experienced a headache upon the initiation of amlodipine, which has since resolved. She reports no other alterations in her medication regimen. Her dietary habits have been satisfactory, although she acknowledges the necessity for dietary control. She finds it challenging to resist snacks. Her diet includes soy-rich foods, as evidenced by her occasional spotting associated with birth control use. She acknowledges a picky eater and preference for certain foods if they are cooked in a specific manner. She previously would eat a whole pizza, now showing restraint eating 1-3 slices. She enjoys flavored apple sauces. She acknowledges a slight decrease in her weight compared to her previous visit. She sporadically monitors her blood pressure. She has yet to consult with a dietitian.    Supplemental Information  She is seeing a psychiatrist.      ROS  Negative except what is mentioned in HPI      Allergies - reviewed:   Allergies   Allergen Reactions    Amoxicillin Hives    Risperidone Other (See Comments)     galactorrhea    Benzoyl Peroxide Rash    Other Rash     Benozyl Peroxide--Rash         Medications - reviewed:   Current Outpatient Medications   Medication Sig    lamoTRIgine (LAMICTAL) 200 MG tablet Take 1 tablet by mouth daily    fluvoxaMINE (LUVOX) 100 MG tablet TAKE THREE TABLETS BY MOUTH EVERY EVENING    amLODIPine (NORVASC) 5 MG tablet Take 1

## 2024-05-24 NOTE — PATIENT INSTRUCTIONS
Continue with your amlodipine 5mg daily  Come in for a sooner visit if BP consistently <110/<60 OR >140/>90 and/or with symptoms of lightheadedness, dizziness, HA, etc  Work on limiting salt intake , staying well hydrated with lots  water during the day and making sure your plate is about half vegetables

## 2024-07-08 DIAGNOSIS — F42.9 OBSESSIVE-COMPULSIVE DISORDER, UNSPECIFIED TYPE: ICD-10-CM

## 2024-07-08 DIAGNOSIS — F41.1 GENERALIZED ANXIETY DISORDER: ICD-10-CM

## 2024-07-08 RX ORDER — LAMOTRIGINE 200 MG/1
200 TABLET ORAL DAILY
Qty: 90 TABLET | Refills: 0 | Status: SHIPPED | OUTPATIENT
Start: 2024-07-08

## 2024-07-18 ENCOUNTER — OFFICE VISIT (OUTPATIENT)
Age: 39
End: 2024-07-18
Payer: MEDICARE

## 2024-07-18 VITALS
OXYGEN SATURATION: 97 % | DIASTOLIC BLOOD PRESSURE: 88 MMHG | WEIGHT: 180 LBS | TEMPERATURE: 98.3 F | BODY MASS INDEX: 31.89 KG/M2 | RESPIRATION RATE: 16 BRPM | SYSTOLIC BLOOD PRESSURE: 134 MMHG | HEIGHT: 63 IN | HEART RATE: 80 BPM

## 2024-07-18 DIAGNOSIS — F42.9 OBSESSIVE-COMPULSIVE DISORDER, UNSPECIFIED TYPE: ICD-10-CM

## 2024-07-18 DIAGNOSIS — F41.1 GENERALIZED ANXIETY DISORDER: ICD-10-CM

## 2024-07-18 PROCEDURE — G8417 CALC BMI ABV UP PARAM F/U: HCPCS | Performed by: NURSE PRACTITIONER

## 2024-07-18 PROCEDURE — 3075F SYST BP GE 130 - 139MM HG: CPT | Performed by: NURSE PRACTITIONER

## 2024-07-18 PROCEDURE — 99214 OFFICE O/P EST MOD 30 MIN: CPT | Performed by: NURSE PRACTITIONER

## 2024-07-18 PROCEDURE — 1036F TOBACCO NON-USER: CPT | Performed by: NURSE PRACTITIONER

## 2024-07-18 PROCEDURE — 3079F DIAST BP 80-89 MM HG: CPT | Performed by: NURSE PRACTITIONER

## 2024-07-18 PROCEDURE — G8427 DOCREV CUR MEDS BY ELIG CLIN: HCPCS | Performed by: NURSE PRACTITIONER

## 2024-07-18 RX ORDER — LAMOTRIGINE 200 MG/1
200 TABLET ORAL DAILY
Qty: 90 TABLET | Refills: 0 | Status: SHIPPED | OUTPATIENT
Start: 2024-07-18

## 2024-07-18 RX ORDER — FLUVOXAMINE MALEATE 100 MG
TABLET ORAL
Qty: 270 TABLET | Refills: 0 | Status: SHIPPED | OUTPATIENT
Start: 2024-07-18

## 2024-07-18 ASSESSMENT — PATIENT HEALTH QUESTIONNAIRE - PHQ9
7. TROUBLE CONCENTRATING ON THINGS, SUCH AS READING THE NEWSPAPER OR WATCHING TELEVISION: NOT AT ALL
8. MOVING OR SPEAKING SO SLOWLY THAT OTHER PEOPLE COULD HAVE NOTICED. OR THE OPPOSITE, BEING SO FIGETY OR RESTLESS THAT YOU HAVE BEEN MOVING AROUND A LOT MORE THAN USUAL: SEVERAL DAYS
SUM OF ALL RESPONSES TO PHQ QUESTIONS 1-9: 1
6. FEELING BAD ABOUT YOURSELF - OR THAT YOU ARE A FAILURE OR HAVE LET YOURSELF OR YOUR FAMILY DOWN: NOT AT ALL
SUM OF ALL RESPONSES TO PHQ9 QUESTIONS 1 & 2: 0
SUM OF ALL RESPONSES TO PHQ QUESTIONS 1-9: 1
5. POOR APPETITE OR OVEREATING: NOT AT ALL
SUM OF ALL RESPONSES TO PHQ QUESTIONS 1-9: 1
3. TROUBLE FALLING OR STAYING ASLEEP: NOT AT ALL
10. IF YOU CHECKED OFF ANY PROBLEMS, HOW DIFFICULT HAVE THESE PROBLEMS MADE IT FOR YOU TO DO YOUR WORK, TAKE CARE OF THINGS AT HOME, OR GET ALONG WITH OTHER PEOPLE: SOMEWHAT DIFFICULT
SUM OF ALL RESPONSES TO PHQ QUESTIONS 1-9: 1
9. THOUGHTS THAT YOU WOULD BE BETTER OFF DEAD, OR OF HURTING YOURSELF: NOT AT ALL
1. LITTLE INTEREST OR PLEASURE IN DOING THINGS: NOT AT ALL
4. FEELING TIRED OR HAVING LITTLE ENERGY: NOT AT ALL
2. FEELING DOWN, DEPRESSED OR HOPELESS: NOT AT ALL

## 2024-07-18 ASSESSMENT — ANXIETY QUESTIONNAIRES
GAD7 TOTAL SCORE: 2
1. FEELING NERVOUS, ANXIOUS, OR ON EDGE: SEVERAL DAYS
IF YOU CHECKED OFF ANY PROBLEMS ON THIS QUESTIONNAIRE, HOW DIFFICULT HAVE THESE PROBLEMS MADE IT FOR YOU TO DO YOUR WORK, TAKE CARE OF THINGS AT HOME, OR GET ALONG WITH OTHER PEOPLE: SOMEWHAT DIFFICULT
2. NOT BEING ABLE TO STOP OR CONTROL WORRYING: SEVERAL DAYS
6. BECOMING EASILY ANNOYED OR IRRITABLE: NOT AT ALL
3. WORRYING TOO MUCH ABOUT DIFFERENT THINGS: NOT AT ALL
4. TROUBLE RELAXING: NOT AT ALL
5. BEING SO RESTLESS THAT IT IS HARD TO SIT STILL: NOT AT ALL
7. FEELING AFRAID AS IF SOMETHING AWFUL MIGHT HAPPEN: NOT AT ALL

## 2024-07-18 NOTE — PROGRESS NOTES
CHIEF COMPLAINT:  Jaclyn Matos is a 39 y.o. female and was seen today for follow-up of psychiatric condition and psychotropic medication management.     HPI:    Jaclyn  reports the following psychiatric symptoms by hx: depression, anxiety, and OCD.Overall symptoms have been present for years. Currently symptoms are of low severity. The symptoms occur less frequently and are less severe. Patient identifies some stress with added responsibilities at her job today. She feels her depression and anxiety are manageable. She rates her depression at 0/10 with 10 being the worst. Anxiety is rated at 3-4/10 with 10 being the worst. Energy level is fairly good. Patient denies any concerns with intrusive thoughts. She reports compliance with medication. Denies side effects. No complaint of rash. Patient feels medications are working well. Patient Appetite:good. Sleep: good, achieving about 8 hours. Patient denies symptoms of psychosis or delphine. Denies suicidal or homicidal ideation.     Current Outpatient Medications on File Prior to Visit   Medication Sig Dispense Refill    amLODIPine (NORVASC) 5 MG tablet Take 1 tablet by mouth daily 90 tablet 0    Multiple Vitamins-Minerals (MULTIVITAMIN ADULTS PO) Take 1 tablet by mouth daily      NORTREL 1/35, 28, 1-35 MG-MCG per tablet Take 1 tablet by mouth daily      ibuprofen (ADVIL;MOTRIN) 600 MG tablet Take 1 tablet by mouth 3 times daily as needed for Pain 30 tablet 0    clonazePAM (KLONOPIN) 0.5 MG tablet Take 1 tablet by mouth daily as needed for Anxiety for up to 90 days. Max Daily Amount: 0.5 mg 30 tablet 2     No current facility-administered medications on file prior to visit.        Past Medical History:   Diagnosis Date    Advanced care planning/counseling discussion     Allergic rhinitis     Autism     dr baumann atri    Galactorrhea 12/2009    dr rivas- due to risperidone    Headache(784.0)     History of chicken pox     Hypertension     OCD (obsessive compulsive  Left message for Stefanie to call back.    Need fax number to Weight Management clinic.

## 2024-07-18 NOTE — PROGRESS NOTES
Chief Complaint   Patient presents with    Medication Check      Vitals:    07/18/24 1502   BP: 134/88   Pulse: 80   Resp: 16   Temp: 98.3 °F (36.8 °C)   SpO2: 97%      Prior to Admission medications    Medication Sig Start Date End Date Taking? Authorizing Provider   lamoTRIgine (LAMICTAL) 200 MG tablet TAKE 1 TABLET BY MOUTH DAILY 7/8/24  Yes Daniella Mane APRN - NP   fluvoxaMINE (LUVOX) 100 MG tablet TAKE THREE TABLETS BY MOUTH EVERY EVENING 4/24/24  Yes Daniella Mane APRN - NP   amLODIPine (NORVASC) 5 MG tablet Take 1 tablet by mouth daily 4/23/24  Yes Ro Lopez MD   Multiple Vitamins-Minerals (MULTIVITAMIN ADULTS PO) Take 1 tablet by mouth daily   Yes ProviderScout MD   NORTREL 1/35, 28, 1-35 MG-MCG per tablet Take 1 tablet by mouth daily 11/13/23  Yes ProviderScout MD   ibuprofen (ADVIL;MOTRIN) 600 MG tablet Take 1 tablet by mouth 3 times daily as needed for Pain 5/16/23  Yes Ro Lopez MD   clonazePAM (KLONOPIN) 0.5 MG tablet Take 1 tablet by mouth daily as needed for Anxiety for up to 90 days. Max Daily Amount: 0.5 mg 1/24/24 5/24/24  Daniella Mane APRN - NP      PHQ-9 Total Score: 1 (7/18/2024  3:01 PM)  Thoughts that you would be better off dead, or of hurting yourself in some way: 0 (7/18/2024  3:01 PM)         7/18/2024     3:01 PM 4/23/2024     2:23 PM 1/24/2024    10:51 AM   PHQ-9    Little interest or pleasure in doing things 0 0 0   Feeling down, depressed, or hopeless 0 0 0   Trouble falling or staying asleep, or sleeping too much 0  0   Feeling tired or having little energy 0  0   Poor appetite or overeating 0  1   Feeling bad about yourself - or that you are a failure or have let yourself or your family down 0  1   Trouble concentrating on things, such as reading the newspaper or watching television 0  0   Moving or speaking so slowly that other people could have noticed. Or the opposite - being so fidgety or restless that you have been moving

## 2024-07-19 DIAGNOSIS — I10 PRIMARY HYPERTENSION: ICD-10-CM

## 2024-07-19 RX ORDER — AMLODIPINE BESYLATE 5 MG/1
5 TABLET ORAL DAILY
Qty: 90 TABLET | Refills: 0 | Status: SHIPPED | OUTPATIENT
Start: 2024-07-19

## 2024-10-17 DIAGNOSIS — I10 PRIMARY HYPERTENSION: ICD-10-CM

## 2024-10-17 RX ORDER — AMLODIPINE BESYLATE 5 MG/1
5 TABLET ORAL DAILY
Qty: 90 TABLET | Refills: 0 | Status: SHIPPED | OUTPATIENT
Start: 2024-10-17

## 2024-10-17 NOTE — TELEPHONE ENCOUNTER
Last appointment: 5/24/24  Next appointment: 12/3/24  Previous refill encounter(s): 7/19/24 #90    Requested Prescriptions     Pending Prescriptions Disp Refills    amLODIPine (NORVASC) 5 MG tablet 90 tablet 1     Sig: Take 1 tablet by mouth daily         For Pharmacy Admin Tracking Only    Program: Medication Refill  CPA in place:    Recommendation Provided To:   Intervention Detail: New Rx: 1, reason: Patient Preference  Intervention Accepted By:   Gap Closed?:    Time Spent (min): 5

## 2024-10-24 ENCOUNTER — TELEMEDICINE (OUTPATIENT)
Age: 39
End: 2024-10-24
Payer: MEDICARE

## 2024-10-24 DIAGNOSIS — F41.1 GENERALIZED ANXIETY DISORDER: ICD-10-CM

## 2024-10-24 DIAGNOSIS — F42.9 OBSESSIVE-COMPULSIVE DISORDER, UNSPECIFIED TYPE: ICD-10-CM

## 2024-10-24 PROCEDURE — G8427 DOCREV CUR MEDS BY ELIG CLIN: HCPCS | Performed by: NURSE PRACTITIONER

## 2024-10-24 PROCEDURE — 1036F TOBACCO NON-USER: CPT | Performed by: NURSE PRACTITIONER

## 2024-10-24 PROCEDURE — G8484 FLU IMMUNIZE NO ADMIN: HCPCS | Performed by: NURSE PRACTITIONER

## 2024-10-24 PROCEDURE — G8417 CALC BMI ABV UP PARAM F/U: HCPCS | Performed by: NURSE PRACTITIONER

## 2024-10-24 PROCEDURE — 99214 OFFICE O/P EST MOD 30 MIN: CPT | Performed by: NURSE PRACTITIONER

## 2024-10-24 RX ORDER — LAMOTRIGINE 200 MG/1
200 TABLET ORAL DAILY
Qty: 90 TABLET | Refills: 1 | Status: SHIPPED | OUTPATIENT
Start: 2024-10-24

## 2024-10-24 RX ORDER — FLUVOXAMINE MALEATE 100 MG
TABLET ORAL
Qty: 270 TABLET | Refills: 1 | Status: SHIPPED | OUTPATIENT
Start: 2024-10-24

## 2024-10-24 ASSESSMENT — ANXIETY QUESTIONNAIRES
7. FEELING AFRAID AS IF SOMETHING AWFUL MIGHT HAPPEN: NOT AT ALL
4. TROUBLE RELAXING: NOT AT ALL
2. NOT BEING ABLE TO STOP OR CONTROL WORRYING: SEVERAL DAYS
6. BECOMING EASILY ANNOYED OR IRRITABLE: NOT AT ALL
3. WORRYING TOO MUCH ABOUT DIFFERENT THINGS: SEVERAL DAYS
IF YOU CHECKED OFF ANY PROBLEMS ON THIS QUESTIONNAIRE, HOW DIFFICULT HAVE THESE PROBLEMS MADE IT FOR YOU TO DO YOUR WORK, TAKE CARE OF THINGS AT HOME, OR GET ALONG WITH OTHER PEOPLE: NOT DIFFICULT AT ALL
5. BEING SO RESTLESS THAT IT IS HARD TO SIT STILL: NOT AT ALL
GAD7 TOTAL SCORE: 2
1. FEELING NERVOUS, ANXIOUS, OR ON EDGE: NOT AT ALL

## 2024-10-24 ASSESSMENT — PATIENT HEALTH QUESTIONNAIRE - PHQ9
1. LITTLE INTEREST OR PLEASURE IN DOING THINGS: NOT AT ALL
5. POOR APPETITE OR OVEREATING: NOT AT ALL
8. MOVING OR SPEAKING SO SLOWLY THAT OTHER PEOPLE COULD HAVE NOTICED. OR THE OPPOSITE, BEING SO FIGETY OR RESTLESS THAT YOU HAVE BEEN MOVING AROUND A LOT MORE THAN USUAL: NOT AT ALL
3. TROUBLE FALLING OR STAYING ASLEEP: NOT AT ALL
10. IF YOU CHECKED OFF ANY PROBLEMS, HOW DIFFICULT HAVE THESE PROBLEMS MADE IT FOR YOU TO DO YOUR WORK, TAKE CARE OF THINGS AT HOME, OR GET ALONG WITH OTHER PEOPLE: NOT DIFFICULT AT ALL
SUM OF ALL RESPONSES TO PHQ QUESTIONS 1-9: 0
SUM OF ALL RESPONSES TO PHQ QUESTIONS 1-9: 0
9. THOUGHTS THAT YOU WOULD BE BETTER OFF DEAD, OR OF HURTING YOURSELF: NOT AT ALL
SUM OF ALL RESPONSES TO PHQ QUESTIONS 1-9: 0
2. FEELING DOWN, DEPRESSED OR HOPELESS: NOT AT ALL
4. FEELING TIRED OR HAVING LITTLE ENERGY: NOT AT ALL
SUM OF ALL RESPONSES TO PHQ QUESTIONS 1-9: 0
6. FEELING BAD ABOUT YOURSELF - OR THAT YOU ARE A FAILURE OR HAVE LET YOURSELF OR YOUR FAMILY DOWN: NOT AT ALL
7. TROUBLE CONCENTRATING ON THINGS, SUCH AS READING THE NEWSPAPER OR WATCHING TELEVISION: NOT AT ALL
SUM OF ALL RESPONSES TO PHQ9 QUESTIONS 1 & 2: 0

## 2024-10-24 NOTE — PROGRESS NOTES
Chief Complaint   Patient presents with    Medication Check     Prior to Admission medications    Medication Sig Start Date End Date Taking? Authorizing Provider   amLODIPine (NORVASC) 5 MG tablet Take 1 tablet by mouth daily 10/17/24  Yes Ro Lopez MD   lamoTRIgine (LAMICTAL) 200 MG tablet Take 1 tablet by mouth daily 7/18/24  Yes Daniella Mane APRN - NP   fluvoxaMINE (LUVOX) 100 MG tablet TAKE THREE TABLETS BY MOUTH EVERY EVENING 7/18/24  Yes Daniella Mane APRN - NP   clonazePAM (KLONOPIN) 0.5 MG tablet Take 1 tablet by mouth daily as needed for Anxiety for up to 90 days. Max Daily Amount: 0.5 mg 1/24/24 10/24/24 Yes Daniella Mane APRN - NP   Multiple Vitamins-Minerals (MULTIVITAMIN ADULTS PO) Take 1 tablet by mouth daily   Yes Scout Ward MD   NORTREL 1/35, 28, 1-35 MG-MCG per tablet Take 1 tablet by mouth daily 11/13/23  Yes Scout Ward MD   ibuprofen (ADVIL;MOTRIN) 600 MG tablet Take 1 tablet by mouth 3 times daily as needed for Pain 5/16/23  Yes Ro Lopez MD       PHQ-9 Total Score: 0 (10/24/2024  2:30 PM)  Thoughts that you would be better off dead, or of hurting yourself in some way: 0 (10/24/2024  2:30 PM)         10/24/2024     2:30 PM 7/18/2024     3:01 PM 4/23/2024     2:23 PM   PHQ-9    Little interest or pleasure in doing things 0 0 0   Feeling down, depressed, or hopeless 0 0 0   Trouble falling or staying asleep, or sleeping too much 0 0    Feeling tired or having little energy 0 0    Poor appetite or overeating 0 0    Feeling bad about yourself - or that you are a failure or have let yourself or your family down 0 0    Trouble concentrating on things, such as reading the newspaper or watching television 0 0    Moving or speaking so slowly that other people could have noticed. Or the opposite - being so fidgety or restless that you have been moving around a lot more than usual 0 1    Thoughts that you would be better off dead, or of hurting

## 2024-12-03 ENCOUNTER — OFFICE VISIT (OUTPATIENT)
Age: 39
End: 2024-12-03
Payer: MEDICARE

## 2024-12-03 VITALS
OXYGEN SATURATION: 99 % | WEIGHT: 184.4 LBS | RESPIRATION RATE: 16 BRPM | HEART RATE: 100 BPM | DIASTOLIC BLOOD PRESSURE: 87 MMHG | BODY MASS INDEX: 32.67 KG/M2 | SYSTOLIC BLOOD PRESSURE: 127 MMHG | HEIGHT: 63 IN | TEMPERATURE: 97.7 F

## 2024-12-03 DIAGNOSIS — R04.0 NASAL BLEEDING: ICD-10-CM

## 2024-12-03 DIAGNOSIS — I10 PRIMARY HYPERTENSION: Primary | ICD-10-CM

## 2024-12-03 PROCEDURE — 99214 OFFICE O/P EST MOD 30 MIN: CPT | Performed by: STUDENT IN AN ORGANIZED HEALTH CARE EDUCATION/TRAINING PROGRAM

## 2024-12-03 RX ORDER — AMLODIPINE BESYLATE 5 MG/1
5 TABLET ORAL DAILY
Qty: 90 TABLET | Refills: 1 | Status: SHIPPED | OUTPATIENT
Start: 2024-12-03

## 2024-12-03 NOTE — PROGRESS NOTES
Chief Complaint   Patient presents with    Hypertension     Follow-up      \"Have you been to the ER, urgent care clinic since your last visit?  Hospitalized since your last visit?\"    NO    “Have you seen or consulted any other health care providers outside our system since your last visit?”    NO     “Have you had a pap smear?”    NO    No cervical cancer screening on file

## 2024-12-03 NOTE — PROGRESS NOTES
Jaclyn Matos  39 y.o. female  1985  [address] [facility] [address]  [id number]     [facility]       Chief Complaint:  Chief Complaint   Patient presents with    Hypertension     Follow-up   Source: self, the medical record     Subjective  History of Present Illness  The patient is here today for a follow-up on her blood pressure.    She was initially prescribed amlodipine in 04/2024 and had a follow-up visit a month later for hypertension management. She continues to take 5 mg of amlodipine daily. However, she has not been monitoring her blood pressure at home recently.    She has been experiencing occasional nosebleeds since before Thanksgiving. These episodes are managed with salt water. She is aware of the need to avoid blowing her nose during a nosebleed and to sneeze gently. The nosebleeds are not severe, but she has noticed blood in her mucus. To stop the bleeding, she pinches her nostrils, leans forward, and counts to 200. She has not been using Flonase or Nasonex recently.    She is also under the care of behavioral health for her history of OCD and anxiety. She is mindful of her diet and often opts for hot tea as a snack.      ROS  Negative except what is mentioned in HPI      Allergies - reviewed:   Allergies   Allergen Reactions    Amoxicillin Hives    Risperidone Other (See Comments)     galactorrhea    Benzoyl Peroxide Rash    Other Rash     Benozyl Peroxide--Rash         Medications - reviewed:   Current Outpatient Medications   Medication Sig    amLODIPine (NORVASC) 5 MG tablet Take 1 tablet by mouth daily    fluvoxaMINE (LUVOX) 100 MG tablet TAKE THREE TABLETS BY MOUTH EVERY EVENING    lamoTRIgine (LAMICTAL) 200 MG tablet Take 1 tablet by mouth daily    clonazePAM (KLONOPIN) 0.5 MG tablet Take 1 tablet by mouth daily as needed for Anxiety for up to 90 days. Max Daily Amount: 0.5 mg    Multiple Vitamins-Minerals (MULTIVITAMIN ADULTS PO) Take 1 tablet

## 2025-03-27 ENCOUNTER — OFFICE VISIT (OUTPATIENT)
Age: 40
End: 2025-03-27
Payer: MEDICARE

## 2025-03-27 VITALS
BODY MASS INDEX: 34.73 KG/M2 | SYSTOLIC BLOOD PRESSURE: 138 MMHG | HEART RATE: 91 BPM | RESPIRATION RATE: 19 BRPM | DIASTOLIC BLOOD PRESSURE: 89 MMHG | OXYGEN SATURATION: 97 % | HEIGHT: 63 IN | WEIGHT: 196 LBS | TEMPERATURE: 98.2 F

## 2025-03-27 DIAGNOSIS — F42.9 OBSESSIVE-COMPULSIVE DISORDER, UNSPECIFIED TYPE: ICD-10-CM

## 2025-03-27 DIAGNOSIS — F41.1 GENERALIZED ANXIETY DISORDER: ICD-10-CM

## 2025-03-27 PROCEDURE — 1036F TOBACCO NON-USER: CPT | Performed by: NURSE PRACTITIONER

## 2025-03-27 PROCEDURE — G8427 DOCREV CUR MEDS BY ELIG CLIN: HCPCS | Performed by: NURSE PRACTITIONER

## 2025-03-27 PROCEDURE — 99214 OFFICE O/P EST MOD 30 MIN: CPT | Performed by: NURSE PRACTITIONER

## 2025-03-27 PROCEDURE — 3075F SYST BP GE 130 - 139MM HG: CPT | Performed by: NURSE PRACTITIONER

## 2025-03-27 PROCEDURE — 3079F DIAST BP 80-89 MM HG: CPT | Performed by: NURSE PRACTITIONER

## 2025-03-27 PROCEDURE — G8417 CALC BMI ABV UP PARAM F/U: HCPCS | Performed by: NURSE PRACTITIONER

## 2025-03-27 RX ORDER — FLUVOXAMINE MALEATE 100 MG
TABLET ORAL
Qty: 270 TABLET | Refills: 0 | Status: SHIPPED | OUTPATIENT
Start: 2025-03-27

## 2025-03-27 RX ORDER — LAMOTRIGINE 200 MG/1
200 TABLET ORAL DAILY
Qty: 90 TABLET | Refills: 0 | Status: SHIPPED | OUTPATIENT
Start: 2025-03-27

## 2025-03-27 ASSESSMENT — ANXIETY QUESTIONNAIRES
1. FEELING NERVOUS, ANXIOUS, OR ON EDGE: NOT AT ALL
IF YOU CHECKED OFF ANY PROBLEMS ON THIS QUESTIONNAIRE, HOW DIFFICULT HAVE THESE PROBLEMS MADE IT FOR YOU TO DO YOUR WORK, TAKE CARE OF THINGS AT HOME, OR GET ALONG WITH OTHER PEOPLE: NOT DIFFICULT AT ALL
5. BEING SO RESTLESS THAT IT IS HARD TO SIT STILL: NOT AT ALL
GAD7 TOTAL SCORE: 0
6. BECOMING EASILY ANNOYED OR IRRITABLE: NOT AT ALL
7. FEELING AFRAID AS IF SOMETHING AWFUL MIGHT HAPPEN: NOT AT ALL
4. TROUBLE RELAXING: NOT AT ALL
3. WORRYING TOO MUCH ABOUT DIFFERENT THINGS: NOT AT ALL
2. NOT BEING ABLE TO STOP OR CONTROL WORRYING: NOT AT ALL

## 2025-03-27 ASSESSMENT — PATIENT HEALTH QUESTIONNAIRE - PHQ9
SUM OF ALL RESPONSES TO PHQ QUESTIONS 1-9: 2
8. MOVING OR SPEAKING SO SLOWLY THAT OTHER PEOPLE COULD HAVE NOTICED. OR THE OPPOSITE, BEING SO FIGETY OR RESTLESS THAT YOU HAVE BEEN MOVING AROUND A LOT MORE THAN USUAL: NOT AT ALL
6. FEELING BAD ABOUT YOURSELF - OR THAT YOU ARE A FAILURE OR HAVE LET YOURSELF OR YOUR FAMILY DOWN: NOT AT ALL
SUM OF ALL RESPONSES TO PHQ QUESTIONS 1-9: 2
9. THOUGHTS THAT YOU WOULD BE BETTER OFF DEAD, OR OF HURTING YOURSELF: NOT AT ALL
1. LITTLE INTEREST OR PLEASURE IN DOING THINGS: NOT AT ALL
10. IF YOU CHECKED OFF ANY PROBLEMS, HOW DIFFICULT HAVE THESE PROBLEMS MADE IT FOR YOU TO DO YOUR WORK, TAKE CARE OF THINGS AT HOME, OR GET ALONG WITH OTHER PEOPLE: NOT DIFFICULT AT ALL
7. TROUBLE CONCENTRATING ON THINGS, SUCH AS READING THE NEWSPAPER OR WATCHING TELEVISION: NOT AT ALL
2. FEELING DOWN, DEPRESSED OR HOPELESS: NOT AT ALL
SUM OF ALL RESPONSES TO PHQ QUESTIONS 1-9: 2
SUM OF ALL RESPONSES TO PHQ QUESTIONS 1-9: 2
5. POOR APPETITE OR OVEREATING: MORE THAN HALF THE DAYS
3. TROUBLE FALLING OR STAYING ASLEEP: NOT AT ALL
4. FEELING TIRED OR HAVING LITTLE ENERGY: NOT AT ALL

## 2025-03-27 NOTE — PROGRESS NOTES
Chief Complaint   Patient presents with    Medication Check      Vitals:    03/27/25 1325   BP: 138/89   Pulse: 91   Resp: 19   Temp: 98.2 °F (36.8 °C)   SpO2: 97%      Prior to Admission medications    Medication Sig Start Date End Date Taking? Authorizing Provider   amLODIPine (NORVASC) 5 MG tablet Take 1 tablet by mouth daily 12/3/24  Yes Ro Lopez MD   fluvoxaMINE (LUVOX) 100 MG tablet TAKE THREE TABLETS BY MOUTH EVERY EVENING 10/24/24  Yes Daniella Mane APRN - NP   lamoTRIgine (LAMICTAL) 200 MG tablet Take 1 tablet by mouth daily 10/24/24  Yes Daniella Mane APRN - NP   clonazePAM (KLONOPIN) 0.5 MG tablet Take 1 tablet by mouth daily as needed for Anxiety for up to 90 days. Max Daily Amount: 0.5 mg 1/24/24 3/27/25 Yes Daniella Mane APRN - NP   Multiple Vitamins-Minerals (MULTIVITAMIN ADULTS PO) Take 1 tablet by mouth daily   Yes ProviderScout MD   NORTREL 1/35, 28, 1-35 MG-MCG per tablet Take 1 tablet by mouth daily 11/13/23  Yes ProviderScout MD   ibuprofen (ADVIL;MOTRIN) 600 MG tablet Take 1 tablet by mouth 3 times daily as needed for Pain 5/16/23  Yes Ro Lopez MD      PHQ-9 Total Score: 2 (3/27/2025  1:26 PM)  Thoughts that you would be better off dead, or of hurting yourself in some way: 0 (3/27/2025  1:26 PM)         3/27/2025     1:26 PM 10/24/2024     2:30 PM 7/18/2024     3:01 PM   PHQ-9    Little interest or pleasure in doing things 0 0 0   Feeling down, depressed, or hopeless 0 0 0   Trouble falling or staying asleep, or sleeping too much 0 0 0   Feeling tired or having little energy 0 0 0   Poor appetite or overeating 2 0 0   Feeling bad about yourself - or that you are a failure or have let yourself or your family down 0 0 0   Trouble concentrating on things, such as reading the newspaper or watching television 0 0 0   Moving or speaking so slowly that other people could have noticed. Or the opposite - being so fidgety or restless that you have

## 2025-03-27 NOTE — PROGRESS NOTES
CHIEF COMPLAINT:  Jaclyn Matos is a 40 y.o. female and was seen today for follow-up of psychiatric condition and psychotropic medication management.     HPI:    Jaclyn reports the following psychiatric symptoms by hx: depression, anxiety, and OCD.Overall symptoms have been present for years. Currently symptoms are of low severity. The symptoms occur less frequently and are less severe. Patient reports stress related to her mother being diagnosed with stage 1 breast cancer. She feels her depression and anxiety are mostly manageable. Her father states that she does have some difficulties with her mother not being as energetic or her normal self. She rates her depression at 1-2/10 with 10 being the worst. Anxiety is rated at 3/10 with 10 being the worst. Energy level is good. Patient denies any concerns with intrusive thoughts. She reports compliance with medication. Denies side effects. No complaint of rash. Patient and mother feels medications are working well. Patient Appetite:good. Sleep: good, achieving about 8 hours. Patient denies symptoms of psychosis or delphine. Denies suicidal or homicidal ideation.     Current Outpatient Medications on File Prior to Visit   Medication Sig Dispense Refill    amLODIPine (NORVASC) 5 MG tablet Take 1 tablet by mouth daily 90 tablet 1    clonazePAM (KLONOPIN) 0.5 MG tablet Take 1 tablet by mouth daily as needed for Anxiety for up to 90 days. Max Daily Amount: 0.5 mg 30 tablet 2    Multiple Vitamins-Minerals (MULTIVITAMIN ADULTS PO) Take 1 tablet by mouth daily      NORTREL 1/35, 28, 1-35 MG-MCG per tablet Take 1 tablet by mouth daily      ibuprofen (ADVIL;MOTRIN) 600 MG tablet Take 1 tablet by mouth 3 times daily as needed for Pain 30 tablet 0     No current facility-administered medications on file prior to visit.        Past Medical History:   Diagnosis Date    Advanced care planning/counseling discussion     Allergic rhinitis     Autism     dr baumann atri    Galactorrhea

## 2025-06-26 ENCOUNTER — OFFICE VISIT (OUTPATIENT)
Age: 40
End: 2025-06-26
Payer: COMMERCIAL

## 2025-06-26 VITALS
BODY MASS INDEX: 35.08 KG/M2 | TEMPERATURE: 97.6 F | DIASTOLIC BLOOD PRESSURE: 89 MMHG | RESPIRATION RATE: 18 BRPM | OXYGEN SATURATION: 99 % | HEIGHT: 63 IN | WEIGHT: 198 LBS | HEART RATE: 106 BPM | SYSTOLIC BLOOD PRESSURE: 135 MMHG

## 2025-06-26 DIAGNOSIS — F41.1 GENERALIZED ANXIETY DISORDER: ICD-10-CM

## 2025-06-26 DIAGNOSIS — F42.9 OBSESSIVE-COMPULSIVE DISORDER, UNSPECIFIED TYPE: ICD-10-CM

## 2025-06-26 PROCEDURE — 3075F SYST BP GE 130 - 139MM HG: CPT | Performed by: NURSE PRACTITIONER

## 2025-06-26 PROCEDURE — 3079F DIAST BP 80-89 MM HG: CPT | Performed by: NURSE PRACTITIONER

## 2025-06-26 PROCEDURE — 99214 OFFICE O/P EST MOD 30 MIN: CPT | Performed by: NURSE PRACTITIONER

## 2025-06-26 ASSESSMENT — PATIENT HEALTH QUESTIONNAIRE - PHQ9
SUM OF ALL RESPONSES TO PHQ QUESTIONS 1-9: 0
7. TROUBLE CONCENTRATING ON THINGS, SUCH AS READING THE NEWSPAPER OR WATCHING TELEVISION: NOT AT ALL
6. FEELING BAD ABOUT YOURSELF - OR THAT YOU ARE A FAILURE OR HAVE LET YOURSELF OR YOUR FAMILY DOWN: NOT AT ALL
4. FEELING TIRED OR HAVING LITTLE ENERGY: NOT AT ALL
2. FEELING DOWN, DEPRESSED OR HOPELESS: NOT AT ALL
10. IF YOU CHECKED OFF ANY PROBLEMS, HOW DIFFICULT HAVE THESE PROBLEMS MADE IT FOR YOU TO DO YOUR WORK, TAKE CARE OF THINGS AT HOME, OR GET ALONG WITH OTHER PEOPLE: NOT DIFFICULT AT ALL
5. POOR APPETITE OR OVEREATING: NOT AT ALL
3. TROUBLE FALLING OR STAYING ASLEEP: NOT AT ALL
9. THOUGHTS THAT YOU WOULD BE BETTER OFF DEAD, OR OF HURTING YOURSELF: NOT AT ALL
8. MOVING OR SPEAKING SO SLOWLY THAT OTHER PEOPLE COULD HAVE NOTICED. OR THE OPPOSITE, BEING SO FIGETY OR RESTLESS THAT YOU HAVE BEEN MOVING AROUND A LOT MORE THAN USUAL: NOT AT ALL

## 2025-06-26 NOTE — PROGRESS NOTES
CHIEF COMPLAINT:  Jaclyn Matos is a 40 y.o. female and was seen today for follow-up of psychiatric condition and psychotropic medication management.     HPI:    Jaclyn reports the following psychiatric symptoms by hx:  depression and anxiety.  Overall symptoms have been present for years. Currently symptoms are of low severity. The symptoms occur less frequently and are less severe. Patient denies any new stressor. She feels her depression and anxiety are stable. Energy level is fair. Patient reports some concerns about her weight and knows that she needs to be more physically active. She reports compliance with medications. Denies side effects. No complaint of chest pain, dizziness, palpitations, headaches. Patient Appetite:good. Sleep: good. Patient denies symptoms of psychosis or delphine. Denies suicidal or homicidal ideation.    Current Outpatient Medications on File Prior to Visit   Medication Sig Dispense Refill    amLODIPine (NORVASC) 5 MG tablet Take 1 tablet by mouth daily 90 tablet 1    Multiple Vitamins-Minerals (MULTIVITAMIN ADULTS PO) Take 1 tablet by mouth daily      NORTREL 1/35, 28, 1-35 MG-MCG per tablet Take 1 tablet by mouth daily      ibuprofen (ADVIL;MOTRIN) 600 MG tablet Take 1 tablet by mouth 3 times daily as needed for Pain 30 tablet 0     No current facility-administered medications on file prior to visit.        Past Medical History:   Diagnosis Date    Advanced care planning/counseling discussion     Allergic rhinitis     Autism     dr baumann atri    Galactorrhea 12/2009    dr rivas- due to risperidone    Headache(784.0)     History of chicken pox     Hypertension     OCD (obsessive compulsive disorder)     Strabismus     Superficial laceration 06/09/2017    on scalp PF [place]-no sutures but got Tdap booster        Family History   Problem Relation Age of Onset    Colon Cancer Neg Hx     Gout Father     Rheum Arthritis Father     Abdominal aortic aneurysm Maternal Aunt     Stroke

## 2025-06-26 NOTE — PROGRESS NOTES
Chief Complaint   Patient presents with    Medication Check      Vitals:    06/26/25 1401   BP: 135/89   Pulse: (!) 106   Resp: 18   Temp: 97.6 °F (36.4 °C)   SpO2: 99%      Prior to Admission medications    Medication Sig Start Date End Date Taking? Authorizing Provider   fluvoxaMINE (LUVOX) 100 MG tablet TAKE THREE TABLETS BY MOUTH EVERY EVENING 3/27/25   Daniella Mane APRN - NP   lamoTRIgine (LAMICTAL) 200 MG tablet Take 1 tablet by mouth daily 3/27/25   Daniella Mane APRN - NP   amLODIPine (NORVASC) 5 MG tablet Take 1 tablet by mouth daily 12/3/24   Ro Lopez MD   clonazePAM (KLONOPIN) 0.5 MG tablet Take 1 tablet by mouth daily as needed for Anxiety for up to 90 days. Max Daily Amount: 0.5 mg 1/24/24 3/27/25  Daniella Mane APRN - NP   Multiple Vitamins-Minerals (MULTIVITAMIN ADULTS PO) Take 1 tablet by mouth daily    ProviderScout MD   NORTREL 1/35, 28, 1-35 MG-MCG per tablet Take 1 tablet by mouth daily 11/13/23   Scout Ward MD   ibuprofen (ADVIL;MOTRIN) 600 MG tablet Take 1 tablet by mouth 3 times daily as needed for Pain 5/16/23   Ro Lopez MD          6/26/2025     2:03 PM 3/27/2025     1:26 PM 10/24/2024     2:30 PM   PHQ-9    Little interest or pleasure in doing things  0 0   Feeling down, depressed, or hopeless 0 0 0   Trouble falling or staying asleep, or sleeping too much 0 0 0   Feeling tired or having little energy 0 0 0   Poor appetite or overeating 0 2 0   Feeling bad about yourself - or that you are a failure or have let yourself or your family down 0 0 0   Trouble concentrating on things, such as reading the newspaper or watching television 0 0 0   Moving or speaking so slowly that other people could have noticed. Or the opposite - being so fidgety or restless that you have been moving around a lot more than usual 0 0 0   Thoughts that you would be better off dead, or of hurting yourself in some way 0 0 0   PHQ-2 Score  0 0   PHQ-9 Total

## 2025-06-27 RX ORDER — CLONAZEPAM 0.5 MG/1
0.5 TABLET ORAL DAILY PRN
Qty: 30 TABLET | Refills: 2 | Status: SHIPPED | OUTPATIENT
Start: 2025-06-27 | End: 2025-09-25

## 2025-06-27 RX ORDER — LAMOTRIGINE 200 MG/1
200 TABLET ORAL DAILY
Qty: 90 TABLET | Refills: 0 | Status: SHIPPED | OUTPATIENT
Start: 2025-06-27

## 2025-06-27 RX ORDER — FLUVOXAMINE MALEATE 100 MG
TABLET ORAL
Qty: 270 TABLET | Refills: 0 | Status: SHIPPED | OUTPATIENT
Start: 2025-06-27

## 2025-07-18 DIAGNOSIS — I10 PRIMARY HYPERTENSION: ICD-10-CM

## 2025-07-18 RX ORDER — AMLODIPINE BESYLATE 5 MG/1
5 TABLET ORAL DAILY
Qty: 30 TABLET | Refills: 0 | Status: SHIPPED | OUTPATIENT
Start: 2025-07-18

## 2025-07-18 NOTE — TELEPHONE ENCOUNTER
Call from Carmen requesting refill for amlodipine.  Prior Jessica patient - has appt w/New Provider 8/5/25 at [facility].    Asking for refill til melytLois Mcgee    Last appointment: 12/3/24 Jessica  Next appointment: 8/5/25 (another practice)  Previous refill encounter(s): 12/3/24 90 + 1    Requested Prescriptions     Pending Prescriptions Disp Refills    amLODIPine (NORVASC) 5 MG tablet 30 tablet 0     Sig: Take 1 tablet by mouth daily     For Pharmacy Admin Tracking Only    Program: Medication Refill  CPA in place:    Recommendation Provided To:   Intervention Detail: New Rx: 1, reason: Patient Preference  Intervention Accepted By:   Gap Closed?:    Time Spent (min): 5

## 2025-07-28 DIAGNOSIS — F42.9 OBSESSIVE-COMPULSIVE DISORDER, UNSPECIFIED TYPE: ICD-10-CM

## 2025-07-28 RX ORDER — FLUVOXAMINE MALEATE 100 MG
TABLET ORAL
Qty: 270 TABLET | Refills: 0 | Status: SHIPPED | OUTPATIENT
Start: 2025-07-28

## 2025-08-04 SDOH — HEALTH STABILITY: PHYSICAL HEALTH: ON AVERAGE, HOW MANY DAYS PER WEEK DO YOU ENGAGE IN MODERATE TO STRENUOUS EXERCISE (LIKE A BRISK WALK)?: 3 DAYS

## 2025-08-04 SDOH — HEALTH STABILITY: PHYSICAL HEALTH: ON AVERAGE, HOW MANY MINUTES DO YOU ENGAGE IN EXERCISE AT THIS LEVEL?: 20 MIN

## 2025-08-05 ENCOUNTER — OFFICE VISIT (OUTPATIENT)
Facility: CLINIC | Age: 40
End: 2025-08-05
Payer: COMMERCIAL

## 2025-08-05 VITALS
TEMPERATURE: 97.5 F | SYSTOLIC BLOOD PRESSURE: 120 MMHG | HEIGHT: 63 IN | BODY MASS INDEX: 33.88 KG/M2 | RESPIRATION RATE: 16 BRPM | WEIGHT: 191.2 LBS | DIASTOLIC BLOOD PRESSURE: 81 MMHG | HEART RATE: 90 BPM | OXYGEN SATURATION: 96 %

## 2025-08-05 DIAGNOSIS — Z00.00 ADULT GENERAL MEDICAL EXAM: Primary | ICD-10-CM

## 2025-08-05 DIAGNOSIS — I10 PRIMARY HYPERTENSION: ICD-10-CM

## 2025-08-05 DIAGNOSIS — Z13.220 LIPID SCREENING: ICD-10-CM

## 2025-08-05 DIAGNOSIS — H61.21 IMPACTED CERUMEN OF RIGHT EAR: ICD-10-CM

## 2025-08-05 DIAGNOSIS — G43.829 MENSTRUAL MIGRAINE WITHOUT STATUS MIGRAINOSUS, NOT INTRACTABLE: ICD-10-CM

## 2025-08-05 LAB
ALBUMIN SERPL-MCNC: 3.5 G/DL (ref 3.5–5.2)
ALBUMIN/GLOB SERPL: 1.1 (ref 1.1–2.2)
ALP SERPL-CCNC: 95 U/L (ref 35–104)
ALT SERPL-CCNC: 13 U/L (ref 10–35)
ANION GAP SERPL CALC-SCNC: 14 MMOL/L (ref 2–14)
AST SERPL-CCNC: 14 U/L (ref 10–35)
BASOPHILS # BLD: 0.07 K/UL (ref 0–0.1)
BASOPHILS NFR BLD: 0.8 % (ref 0–1)
BILIRUB SERPL-MCNC: <0.2 MG/DL (ref 0–1.2)
BUN SERPL-MCNC: 11 MG/DL (ref 6–20)
BUN/CREAT SERPL: 18 (ref 12–20)
CALCIUM SERPL-MCNC: 9 MG/DL (ref 8.6–10)
CHLORIDE SERPL-SCNC: 103 MMOL/L (ref 98–107)
CHOLEST SERPL-MCNC: 261 MG/DL (ref 0–200)
CO2 SERPL-SCNC: 20 MMOL/L (ref 20–29)
CREAT SERPL-MCNC: 0.65 MG/DL (ref 0.6–1)
DIFFERENTIAL METHOD BLD: NORMAL
EOSINOPHIL # BLD: 0.29 K/UL (ref 0–0.4)
EOSINOPHIL NFR BLD: 3.4 % (ref 0–7)
ERYTHROCYTE [DISTWIDTH] IN BLOOD BY AUTOMATED COUNT: 13.1 % (ref 11.5–14.5)
GLOBULIN SER CALC-MCNC: 3.2 G/DL (ref 2–4)
GLUCOSE SERPL-MCNC: 77 MG/DL (ref 65–100)
HCT VFR BLD AUTO: 41.8 % (ref 35–47)
HDLC SERPL-MCNC: 42 MG/DL (ref 40–60)
HDLC SERPL: 6.2
HGB BLD-MCNC: 13.7 G/DL (ref 11.5–16)
IMM GRANULOCYTES # BLD AUTO: 0.02 K/UL (ref 0–0.04)
IMM GRANULOCYTES NFR BLD AUTO: 0.2 % (ref 0–0.5)
LDLC SERPL CALC-MCNC: 170 MG/DL
LYMPHOCYTES # BLD: 2.7 K/UL (ref 0.8–3.5)
LYMPHOCYTES NFR BLD: 31.4 % (ref 12–49)
MCH RBC QN AUTO: 29.3 PG (ref 26–34)
MCHC RBC AUTO-ENTMCNC: 32.8 G/DL (ref 30–36.5)
MCV RBC AUTO: 89.5 FL (ref 80–99)
MONOCYTES # BLD: 0.58 K/UL (ref 0–1)
MONOCYTES NFR BLD: 6.7 % (ref 5–13)
NEUTS SEG # BLD: 4.95 K/UL (ref 1.8–8)
NEUTS SEG NFR BLD: 57.5 % (ref 32–75)
NRBC # BLD: 0 K/UL (ref 0–0.01)
NRBC BLD-RTO: 0 PER 100 WBC
PLATELET # BLD AUTO: 377 K/UL (ref 150–400)
PMV BLD AUTO: 10.2 FL (ref 8.9–12.9)
POTASSIUM SERPL-SCNC: 4.3 MMOL/L (ref 3.5–5.1)
PROT SERPL-MCNC: 6.7 G/DL (ref 6.4–8.3)
RBC # BLD AUTO: 4.67 M/UL (ref 3.8–5.2)
SODIUM SERPL-SCNC: 137 MMOL/L (ref 136–145)
TRIGL SERPL-MCNC: 243 MG/DL (ref 0–150)
VLDLC SERPL CALC-MCNC: 49 MG/DL
WBC # BLD AUTO: 8.6 K/UL (ref 3.6–11)

## 2025-08-05 PROCEDURE — 99386 PREV VISIT NEW AGE 40-64: CPT | Performed by: FAMILY MEDICINE

## 2025-08-05 PROCEDURE — 69209 REMOVE IMPACTED EAR WAX UNI: CPT | Performed by: FAMILY MEDICINE

## 2025-08-05 PROCEDURE — 3074F SYST BP LT 130 MM HG: CPT | Performed by: FAMILY MEDICINE

## 2025-08-05 PROCEDURE — 3079F DIAST BP 80-89 MM HG: CPT | Performed by: FAMILY MEDICINE

## 2025-08-05 RX ORDER — AMLODIPINE BESYLATE 5 MG/1
5 TABLET ORAL DAILY
Qty: 90 TABLET | Refills: 3 | Status: SHIPPED | OUTPATIENT
Start: 2025-08-05

## 2025-08-05 SDOH — ECONOMIC STABILITY: FOOD INSECURITY: WITHIN THE PAST 12 MONTHS, YOU WORRIED THAT YOUR FOOD WOULD RUN OUT BEFORE YOU GOT MONEY TO BUY MORE.: NEVER TRUE

## 2025-08-05 SDOH — ECONOMIC STABILITY: FOOD INSECURITY: WITHIN THE PAST 12 MONTHS, THE FOOD YOU BOUGHT JUST DIDN'T LAST AND YOU DIDN'T HAVE MONEY TO GET MORE.: NEVER TRUE
